# Patient Record
Sex: FEMALE | Race: WHITE | NOT HISPANIC OR LATINO
[De-identification: names, ages, dates, MRNs, and addresses within clinical notes are randomized per-mention and may not be internally consistent; named-entity substitution may affect disease eponyms.]

---

## 2019-11-14 ENCOUNTER — APPOINTMENT (OUTPATIENT)
Dept: OPHTHALMOLOGY | Facility: CLINIC | Age: 44
End: 2019-11-14

## 2019-12-05 ENCOUNTER — APPOINTMENT (OUTPATIENT)
Dept: OPHTHALMOLOGY | Facility: CLINIC | Age: 44
End: 2019-12-05

## 2019-12-05 ENCOUNTER — OUTPATIENT (OUTPATIENT)
Dept: OUTPATIENT SERVICES | Facility: HOSPITAL | Age: 44
LOS: 1 days | End: 2019-12-05

## 2019-12-05 DIAGNOSIS — H40.009 PREGLAUCOMA, UNSPECIFIED, UNSPECIFIED EYE: ICD-10-CM

## 2019-12-05 DIAGNOSIS — H18.609 KERATOCONUS, UNSPECIFIED, UNSPECIFIED EYE: ICD-10-CM

## 2019-12-05 PROBLEM — Z00.00 ENCOUNTER FOR PREVENTIVE HEALTH EXAMINATION: Status: ACTIVE | Noted: 2019-12-05

## 2021-04-15 ENCOUNTER — OUTPATIENT (OUTPATIENT)
Dept: OUTPATIENT SERVICES | Facility: HOSPITAL | Age: 46
LOS: 1 days | End: 2021-04-15

## 2021-04-15 ENCOUNTER — APPOINTMENT (OUTPATIENT)
Dept: OPHTHALMOLOGY | Facility: CLINIC | Age: 46
End: 2021-04-15

## 2021-04-16 DIAGNOSIS — H18.609 KERATOCONUS, UNSPECIFIED, UNSPECIFIED EYE: ICD-10-CM

## 2021-04-16 DIAGNOSIS — H40.003 PREGLAUCOMA, UNSPECIFIED, BILATERAL: ICD-10-CM

## 2022-08-11 ENCOUNTER — APPOINTMENT (OUTPATIENT)
Dept: OPHTHALMOLOGY | Facility: CLINIC | Age: 47
End: 2022-08-11

## 2022-08-11 ENCOUNTER — OUTPATIENT (OUTPATIENT)
Dept: OUTPATIENT SERVICES | Facility: HOSPITAL | Age: 47
LOS: 1 days | End: 2022-08-11

## 2022-08-12 DIAGNOSIS — H35.30 UNSPECIFIED MACULAR DEGENERATION: ICD-10-CM

## 2022-08-12 DIAGNOSIS — H40.009 PREGLAUCOMA, UNSPECIFIED, UNSPECIFIED EYE: ICD-10-CM

## 2022-09-11 ENCOUNTER — NON-APPOINTMENT (OUTPATIENT)
Age: 47
End: 2022-09-11

## 2022-09-26 ENCOUNTER — NON-APPOINTMENT (OUTPATIENT)
Age: 47
End: 2022-09-26

## 2022-09-27 ENCOUNTER — NON-APPOINTMENT (OUTPATIENT)
Age: 47
End: 2022-09-27

## 2022-09-27 ENCOUNTER — APPOINTMENT (OUTPATIENT)
Dept: CARDIOLOGY | Facility: CLINIC | Age: 47
End: 2022-09-27

## 2022-09-27 VITALS
WEIGHT: 150 LBS | OXYGEN SATURATION: 100 % | SYSTOLIC BLOOD PRESSURE: 90 MMHG | DIASTOLIC BLOOD PRESSURE: 62 MMHG | BODY MASS INDEX: 26.58 KG/M2 | HEART RATE: 64 BPM | HEIGHT: 63 IN

## 2022-09-27 DIAGNOSIS — R07.89 OTHER CHEST PAIN: ICD-10-CM

## 2022-09-27 PROCEDURE — 99244 OFF/OP CNSLTJ NEW/EST MOD 40: CPT

## 2022-09-27 PROCEDURE — 93015 CV STRESS TEST SUPVJ I&R: CPT

## 2022-09-27 PROCEDURE — 93000 ELECTROCARDIOGRAM COMPLETE: CPT

## 2022-09-27 PROCEDURE — 99214 OFFICE O/P EST MOD 30 MIN: CPT

## 2022-09-27 RX ORDER — SERTRALINE 25 MG/1
25 TABLET, FILM COATED ORAL DAILY
Refills: 0 | Status: ACTIVE | COMMUNITY

## 2022-09-27 RX ORDER — USTEKINUMAB 90 MG/ML
90 INJECTION, SOLUTION SUBCUTANEOUS
Refills: 0 | Status: ACTIVE | COMMUNITY

## 2022-09-27 RX ORDER — LEVOTHYROXINE SODIUM 75 UG/1
75 TABLET ORAL DAILY
Refills: 0 | Status: ACTIVE | COMMUNITY

## 2022-09-27 RX ORDER — BUPROPION HYDROCHLORIDE 100 MG/1
TABLET, FILM COATED ORAL DAILY
Refills: 0 | Status: ACTIVE | COMMUNITY

## 2022-09-28 NOTE — ASSESSMENT
[FreeTextEntry1] : A/P:\par \par *Surveillance stress testing\par *no symptoms.\par -exericse treadmill\par \par Will review results of EST after it is completed.\par ==================\par \par Reviewed results of EST performed same day as visit.\par No symptoms no ischemia at peak stress.\par Normal study no further evaluation.\par No followup scheduled.

## 2022-09-28 NOTE — HISTORY OF PRESENT ILLNESS
[FreeTextEntry1] : BOBY DEL CID (BOBY)1975(47y)F\par \par "may need stress test - referred by internist"\par \par 48 y/o\par \par *crohn's\par *anxiety\par *hypoT4\par \par pt states she was referred for stress test for surveillance ("check up').\par No chest discomfort.  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

## 2023-04-27 ENCOUNTER — APPOINTMENT (OUTPATIENT)
Dept: ULTRASOUND IMAGING | Facility: CLINIC | Age: 48
End: 2023-04-27

## 2023-04-27 ENCOUNTER — APPOINTMENT (OUTPATIENT)
Dept: MAMMOGRAPHY | Facility: CLINIC | Age: 48
End: 2023-04-27

## 2023-11-07 ENCOUNTER — ASOB RESULT (OUTPATIENT)
Age: 48
End: 2023-11-07

## 2023-11-07 ENCOUNTER — APPOINTMENT (OUTPATIENT)
Dept: ANTEPARTUM | Facility: CLINIC | Age: 48
End: 2023-11-07
Payer: COMMERCIAL

## 2023-11-07 PROCEDURE — 76811 OB US DETAILED SNGL FETUS: CPT

## 2023-11-16 ENCOUNTER — APPOINTMENT (OUTPATIENT)
Dept: PEDIATRIC CARDIOLOGY | Facility: CLINIC | Age: 48
End: 2023-11-16
Payer: COMMERCIAL

## 2023-11-16 PROCEDURE — 99203 OFFICE O/P NEW LOW 30 MIN: CPT | Mod: 25

## 2023-11-16 PROCEDURE — 76825 ECHO EXAM OF FETAL HEART: CPT

## 2023-11-16 PROCEDURE — 93325 DOPPLER ECHO COLOR FLOW MAPG: CPT

## 2023-11-16 PROCEDURE — 76827 ECHO EXAM OF FETAL HEART: CPT

## 2024-01-17 ENCOUNTER — OUTPATIENT (OUTPATIENT)
Dept: OUTPATIENT SERVICES | Facility: HOSPITAL | Age: 49
LOS: 1 days | End: 2024-01-17
Payer: COMMERCIAL

## 2024-01-17 VITALS
RESPIRATION RATE: 16 BRPM | DIASTOLIC BLOOD PRESSURE: 55 MMHG | OXYGEN SATURATION: 99 % | SYSTOLIC BLOOD PRESSURE: 105 MMHG | TEMPERATURE: 98 F | HEART RATE: 69 BPM

## 2024-01-17 VITALS
TEMPERATURE: 98 F | SYSTOLIC BLOOD PRESSURE: 110 MMHG | RESPIRATION RATE: 18 BRPM | HEART RATE: 70 BPM | DIASTOLIC BLOOD PRESSURE: 59 MMHG

## 2024-01-17 DIAGNOSIS — Z98.1 ARTHRODESIS STATUS: Chronic | ICD-10-CM

## 2024-01-17 DIAGNOSIS — O26.899 OTHER SPECIFIED PREGNANCY RELATED CONDITIONS, UNSPECIFIED TRIMESTER: ICD-10-CM

## 2024-01-17 PROCEDURE — 59025 FETAL NON-STRESS TEST: CPT

## 2024-01-17 PROCEDURE — 99221 1ST HOSP IP/OBS SF/LOW 40: CPT | Mod: 25

## 2024-01-17 PROCEDURE — 59025 FETAL NON-STRESS TEST: CPT | Mod: 26

## 2024-01-17 PROCEDURE — G0463: CPT

## 2024-01-17 NOTE — OB PROVIDER TRIAGE NOTE - HISTORY OF PRESENT ILLNESS
Subjective  HPI: 48yoF  at 30w5d presents for vaginal bleeding. Pt reports a small volume of light pink staining yesterday. Today pt noted the bleeding is bright red, still just spotting. Pt was seen by her OB/GYN who noted a cervical polyp that wasn't bleeding at the time of the evaluation. Pt was sent in for prolonged monitoring for "dips" noted at the NST preformed at this time. Pt otherwise feels well. Denies abdominal pain / contractions. No LOF. Good fetal movement. Pt reports  gestational age presents for _. +FM. -LOF. -CTXs. -VB. Pt denies any other concerns.    – PNC: Denies prenatal issues. GBS _.  EFW _g by sono.  – OBHx:   – GynHx: denies  – PMH: denies  – PSH: denies  – Psych: denies   – Social: denies   – Meds: PNV   – Allergies: NKDA  – Will accept blood transfusions? Yes    Objective  Vital signs    – PE:   CV: RRR  Pulm: breathing comfortably on RA  Abd: gravid, nontender  Extr: moving all extremities with ease  – FS:   – Spec: pooling, nitrazine, ferning, bleeding,  (lesions if patient with HSV2 history)  – VE: //  – FHT: baseline 1, mod variability, +accels, -decels  – [place]: qmin  – EFW: _g by sono  – Sono: vertex    Assessment  – No prenatal issues. GBS _.    Plan        Patient discussed with attending physician, Dr. Blanco Vivar, PGY-3 Subjective  HPI: 48yoF  at 30w5d presents for vaginal bleeding. Pt reports a small volume of light pink staining yesterday. Today pt noted the bleeding is bright red, still just spotting. Pt was seen by her OB/GYN who noted a cervical polyp that wasn't bleeding at the time of the evaluation. Pt was sent in for prolonged monitoring for "dips" noted at the NST preformed at this time. Pt otherwise feels well. Denies abdominal pain / contractions. No LOF. Good fetal movement. Pt denies any recent increase in physical activity. Reports pelvic rest. Good PO hydration. Denies constipation, diarrhea, urinary frequency, or dysuria. Pt has a history of Chrons disease but denies rectal bleeding.  reports  gestational age presents for +FM. -LOF. -CTXs. -VB. Pt denies any other concerns.    – PNC: Denies prenatal issues. GBS _.  EFW _g by sono.  – OBHx:   – GynHx: denies  – PMH: denies  – PSH: denies  – Psych: denies   – Social: denies   – Meds: PNV   – Allergies: NKDA  – Will accept blood transfusions? Yes    Objective  Vital signs    – PE:   CV: RRR  Pulm: breathing comfortably on RA  Abd: gravid, nontender  Extr: moving all extremities with ease  – FS:   – Spec: pooling, nitrazine, ferning, bleeding,  (lesions if patient with HSV2 history)  – VE: //  – FHT: baseline 1, mod variability, +accels, -decels  – [place]: qmin  – EFW: _g by sono  – Sono: vertex    Assessment  – No prenatal issues. GBS _.    Plan        Patient discussed with attending physician, Dr. Blanco Vivar, PGY-3 Subjective  HPI: 48yoF  at 30w5d presents for vaginal bleeding. Pt reports a small volume of light pink staining yesterday. Today pt noted the bleeding is bright red, still just spotting. Pt was seen by her OB/GYN who noted a cervical polyp that wasn't bleeding at the time of the evaluation. Pt was sent in for prolonged monitoring for "dips" noted at the NST preformed at this time. Pt otherwise feels well. Denies abdominal pain / contractions. No LOF. Good fetal movement. Pt denies any recent increase in physical activity. Reports pelvic rest. Good PO hydration. Denies constipation, diarrhea, urinary frequency, or dysuria. Pt has a history of Chrons disease but denies rectal bleeding and reports that she is currently well controlled.     – PNC: IVF pregnancy. Gestational hypothyroidism. Denies previous episodes of vaginal bleeding.  EFW ~1000g by sono 2weeks ago.   – OBHx: First pregnancy   – GynHx: Denies   – PMH: Chrons disease  – PSH: Scoliosis s/p kriss placement on .    – Psych: denies   – Social: denies   – Meds: PNV, ASA, Synthroid 112mcg   – Allergies: NKDA  – Will accept blood transfusions? Yes    Objective  Vital Signs Last 24 Hrs  T(C): 36.8 (2024 12:52), Max: 36.8 (2024 12:52)  T(F): 98.2 (2024 12:52), Max: 98.2 (2024 12:52)  HR: 73 (2024 14:40) (66 - 81)  BP: 105/55 (2024 13:01) (105/55 - 105/55)  BP(mean): --  RR: 16 (2024 12:52) (16 - 16)  SpO2: 100% (2024 14:40) (97% - 100%)    Parameters below as of 2024 12:52  Patient On (Oxygen Delivery Method): room air    – PE:   CV: RRR  Pulm: breathing comfortably on RA  Abd: gravid, nontender  Extr: moving all extremities with ease  – Spec: 1cm cervical polyp, pinpoint area of bleeding noted. Scant bleeding noted in the vault.   – FHT: baseline 140, mod variability, +accels, -decels  – [place]: None  – EFW: ~1000g by sono last week   – Sono: vertex. [place] 13. BPP 8/8. CL, average 3.8

## 2024-01-17 NOTE — OB RN TRIAGE NOTE - FALL HARM RISK - UNIVERSAL INTERVENTIONS
Bed in lowest position, wheels locked, appropriate side rails in place/Call bell, personal items and telephone in reach/Instruct patient to call for assistance before getting out of bed or chair/Non-slip footwear when patient is out of bed/[place] to call system/Physically safe environment - no spills, clutter or unnecessary equipment/Purposeful Proactive Rounding/Room/bathroom lighting operational, light cord in reach

## 2024-01-17 NOTE — OB PROVIDER TRIAGE NOTE - ATTENDING COMMENTS
OB attending     Patient seen agree with above     P0 at 30 weeks sent from office for prolonged monitoring due to 2 variables noted, reactive NST here no decels   Exam showed cervical polyp, minimal blood in vault, reassurance provided     NST reactive, BPP 8-8    Plan:   -DC home  -FU office in 1 week as scheduled  -bleeding precautions given    Martha Simeon MD

## 2024-01-17 NOTE — OB PROVIDER TRIAGE NOTE - NSOBPROVIDERNOTE_OBGYN_ALL_OB_FT
Assessment  – Vaginal bleeding from cervical polyp. No abdominal pain or contractions seen on toco. Fetal status reassuring.     Plan  - Pt is hemodynamically stable with scant bright red bleeding.   - NST for one hour.   - If fetal status is reassuring then pt to be discharged home with pad counts and return precautions.       Patient discussed with attending physician, Dr. Blanco Vivar, PGY-3

## 2024-01-19 DIAGNOSIS — O36.8330 MATERNAL CARE FOR ABNORMALITIES OF THE FETAL HEART RATE OR RHYTHM, THIRD TRIMESTER, NOT APPLICABLE OR UNSPECIFIED: ICD-10-CM

## 2024-01-19 DIAGNOSIS — Z3A.30 30 WEEKS GESTATION OF PREGNANCY: ICD-10-CM

## 2024-01-19 DIAGNOSIS — E03.9 HYPOTHYROIDISM, UNSPECIFIED: ICD-10-CM

## 2024-01-19 DIAGNOSIS — O46.93 ANTEPARTUM HEMORRHAGE, UNSPECIFIED, THIRD TRIMESTER: ICD-10-CM

## 2024-01-19 DIAGNOSIS — O09.523 SUPERVISION OF ELDERLY MULTIGRAVIDA, THIRD TRIMESTER: ICD-10-CM

## 2024-01-19 DIAGNOSIS — O09.813 SUPERVISION OF PREGNANCY RESULTING FROM ASSISTED REPRODUCTIVE TECHNOLOGY, THIRD TRIMESTER: ICD-10-CM

## 2024-01-19 DIAGNOSIS — O99.283 ENDOCRINE, NUTRITIONAL AND METABOLIC DISEASES COMPLICATING PREGNANCY, THIRD TRIMESTER: ICD-10-CM

## 2024-02-07 PROBLEM — E03.9 HYPOTHYROIDISM, UNSPECIFIED: Chronic | Status: ACTIVE | Noted: 2024-01-17

## 2024-02-07 PROBLEM — K50.90 CROHN'S DISEASE, UNSPECIFIED, WITHOUT COMPLICATIONS: Chronic | Status: ACTIVE | Noted: 2024-01-17

## 2024-02-09 ENCOUNTER — NON-APPOINTMENT (OUTPATIENT)
Age: 49
End: 2024-02-09

## 2024-02-15 ENCOUNTER — APPOINTMENT (OUTPATIENT)
Dept: ANTEPARTUM | Facility: CLINIC | Age: 49
End: 2024-02-15
Payer: COMMERCIAL

## 2024-02-15 PROCEDURE — 99367 TEAM CONF W/O PAT BY PHYS: CPT | Mod: 1L

## 2024-02-29 ENCOUNTER — EMERGENCY (EMERGENCY)
Facility: HOSPITAL | Age: 49
LOS: 1 days | Discharge: HOME | End: 2024-02-29
Attending: EMERGENCY MEDICINE
Payer: COMMERCIAL

## 2024-02-29 VITALS
RESPIRATION RATE: 19 BRPM | HEART RATE: 88 BPM | OXYGEN SATURATION: 99 % | HEIGHT: 64 IN | TEMPERATURE: 98 F | DIASTOLIC BLOOD PRESSURE: 63 MMHG | WEIGHT: 190.04 LBS | SYSTOLIC BLOOD PRESSURE: 102 MMHG

## 2024-02-29 VITALS
OXYGEN SATURATION: 100 % | TEMPERATURE: 98 F | HEART RATE: 88 BPM | SYSTOLIC BLOOD PRESSURE: 121 MMHG | DIASTOLIC BLOOD PRESSURE: 68 MMHG | RESPIRATION RATE: 18 BRPM

## 2024-02-29 DIAGNOSIS — Z98.1 ARTHRODESIS STATUS: Chronic | ICD-10-CM

## 2024-02-29 LAB
ALBUMIN SERPL ELPH-MCNC: 3.2 G/DL — LOW (ref 3.3–5)
ALP SERPL-CCNC: 137 U/L — HIGH (ref 40–120)
ALT FLD-CCNC: 15 U/L — SIGNIFICANT CHANGE UP (ref 10–45)
ANION GAP SERPL CALC-SCNC: 10 MMOL/L — SIGNIFICANT CHANGE UP (ref 5–17)
AST SERPL-CCNC: 23 U/L — SIGNIFICANT CHANGE UP (ref 10–40)
BASOPHILS # BLD AUTO: 0 K/UL — SIGNIFICANT CHANGE UP (ref 0–0.2)
BASOPHILS NFR BLD AUTO: 0 % — SIGNIFICANT CHANGE UP (ref 0–2)
BILIRUB SERPL-MCNC: 0.2 MG/DL — SIGNIFICANT CHANGE UP (ref 0.2–1.2)
BUN SERPL-MCNC: 5 MG/DL — LOW (ref 7–23)
CALCIUM SERPL-MCNC: 9.7 MG/DL — SIGNIFICANT CHANGE UP (ref 8.4–10.5)
CHLORIDE SERPL-SCNC: 103 MMOL/L — SIGNIFICANT CHANGE UP (ref 96–108)
CO2 SERPL-SCNC: 25 MMOL/L — SIGNIFICANT CHANGE UP (ref 22–31)
CREAT SERPL-MCNC: 0.64 MG/DL — SIGNIFICANT CHANGE UP (ref 0.5–1.3)
DACRYOCYTES BLD QL SMEAR: SLIGHT — SIGNIFICANT CHANGE UP
EGFR: 109 ML/MIN/1.73M2 — SIGNIFICANT CHANGE UP
ELLIPTOCYTES BLD QL SMEAR: SLIGHT — SIGNIFICANT CHANGE UP
EOSINOPHIL # BLD AUTO: 0 K/UL — SIGNIFICANT CHANGE UP (ref 0–0.5)
EOSINOPHIL NFR BLD AUTO: 0 % — SIGNIFICANT CHANGE UP (ref 0–6)
FLUAV AG NPH QL: DETECTED
FLUBV AG NPH QL: SIGNIFICANT CHANGE UP
GLUCOSE SERPL-MCNC: 90 MG/DL — SIGNIFICANT CHANGE UP (ref 70–99)
HCT VFR BLD CALC: 32.3 % — LOW (ref 34.5–45)
HGB BLD-MCNC: 10.6 G/DL — LOW (ref 11.5–15.5)
LYMPHOCYTES # BLD AUTO: 0.55 K/UL — LOW (ref 1–3.3)
LYMPHOCYTES # BLD AUTO: 6.1 % — LOW (ref 13–44)
MANUAL SMEAR VERIFICATION: SIGNIFICANT CHANGE UP
MCHC RBC-ENTMCNC: 32.1 PG — SIGNIFICANT CHANGE UP (ref 27–34)
MCHC RBC-ENTMCNC: 32.8 GM/DL — SIGNIFICANT CHANGE UP (ref 32–36)
MCV RBC AUTO: 97.9 FL — SIGNIFICANT CHANGE UP (ref 80–100)
METAMYELOCYTES # FLD: 0.9 % — HIGH (ref 0–0)
MONOCYTES # BLD AUTO: 0.23 K/UL — SIGNIFICANT CHANGE UP (ref 0–0.9)
MONOCYTES NFR BLD AUTO: 2.6 % — SIGNIFICANT CHANGE UP (ref 2–14)
MYELOCYTES NFR BLD: 0.9 % — HIGH (ref 0–0)
NEUTROPHILS # BLD AUTO: 8.04 K/UL — HIGH (ref 1.8–7.4)
NEUTROPHILS NFR BLD AUTO: 88.6 % — HIGH (ref 43–77)
NEUTS BAND # BLD: 0.9 % — SIGNIFICANT CHANGE UP (ref 0–8)
OVALOCYTES BLD QL SMEAR: SLIGHT — SIGNIFICANT CHANGE UP
PLAT MORPH BLD: NORMAL — SIGNIFICANT CHANGE UP
PLATELET # BLD AUTO: 166 K/UL — SIGNIFICANT CHANGE UP (ref 150–400)
POIKILOCYTOSIS BLD QL AUTO: SLIGHT — SIGNIFICANT CHANGE UP
POLYCHROMASIA BLD QL SMEAR: SLIGHT — SIGNIFICANT CHANGE UP
POTASSIUM SERPL-MCNC: 3 MMOL/L — LOW (ref 3.5–5.3)
POTASSIUM SERPL-SCNC: 3 MMOL/L — LOW (ref 3.5–5.3)
PROT SERPL-MCNC: 6.2 G/DL — SIGNIFICANT CHANGE UP (ref 6–8.3)
RBC # BLD: 3.3 M/UL — LOW (ref 3.8–5.2)
RBC # FLD: 13.2 % — SIGNIFICANT CHANGE UP (ref 10.3–14.5)
RBC BLD AUTO: ABNORMAL
RSV RNA NPH QL NAA+NON-PROBE: SIGNIFICANT CHANGE UP
SARS-COV-2 RNA SPEC QL NAA+PROBE: SIGNIFICANT CHANGE UP
SODIUM SERPL-SCNC: 138 MMOL/L — SIGNIFICANT CHANGE UP (ref 135–145)
WBC # BLD: 8.98 K/UL — SIGNIFICANT CHANGE UP (ref 3.8–10.5)
WBC # FLD AUTO: 8.98 K/UL — SIGNIFICANT CHANGE UP (ref 3.8–10.5)

## 2024-02-29 PROCEDURE — 71045 X-RAY EXAM CHEST 1 VIEW: CPT | Mod: 26

## 2024-02-29 PROCEDURE — 99285 EMERGENCY DEPT VISIT HI MDM: CPT | Mod: 25

## 2024-02-29 PROCEDURE — 87637 SARSCOV2&INF A&B&RSV AMP PRB: CPT

## 2024-02-29 PROCEDURE — 71045 X-RAY EXAM CHEST 1 VIEW: CPT

## 2024-02-29 PROCEDURE — 93005 ELECTROCARDIOGRAM TRACING: CPT

## 2024-02-29 PROCEDURE — 85025 COMPLETE CBC W/AUTO DIFF WBC: CPT

## 2024-02-29 PROCEDURE — 36415 COLL VENOUS BLD VENIPUNCTURE: CPT

## 2024-02-29 PROCEDURE — 99285 EMERGENCY DEPT VISIT HI MDM: CPT

## 2024-02-29 PROCEDURE — 80053 COMPREHEN METABOLIC PANEL: CPT

## 2024-02-29 RX ORDER — POTASSIUM CHLORIDE 20 MEQ
40 PACKET (EA) ORAL ONCE
Refills: 0 | Status: COMPLETED | OUTPATIENT
Start: 2024-02-29 | End: 2024-02-29

## 2024-02-29 RX ADMIN — Medication 40 MILLIEQUIVALENT(S): at 18:42

## 2024-02-29 RX ADMIN — Medication 75 MILLIGRAM(S): at 18:42

## 2024-02-29 NOTE — ED PROVIDER NOTE - OBJECTIVE STATEMENT
48-year-old female with past medical history Crohn disease, G1, P0, presents to the emergency department due to 5 days of shortness of breath associated with cough, congestion, body aches.  Highest temperature measured at home was 99 °F.  She went to an urgent care yesterday where she was given a Z-Lamine.  She called her OB/GYN, Dr. Montoya, who told her to come into the emergency department today after she also reported right-sided chest pain due to concern for pneumonia.

## 2024-02-29 NOTE — ED PROVIDER NOTE - NSFOLLOWUPINSTRUCTIONS_ED_ALL_ED_FT
You were seen in the emergency department for shortness of breath. We have evaluated you and determined that you do not require further hospital interventions.    During your stay you had the following relevant results: A chest x-ray that does not show pneumonia, a viral swab positive for influenza A    Please follow up with your OB/GYN to discuss the results of your stay in our department.    You are being sent home with one or more prescription medications.  The dosing, frequency, and pharmacy information are included in this discharge paperwork.  Please take each one as instructed.    If you start to experience worsening symptoms such as nausea or vomiting, fevers or chills, chest pain or shortness of breath, please return to the emergency department for further evaluation.

## 2024-02-29 NOTE — ED ADULT NURSE NOTE - NSFALLASSESSNEED_ED_ALL_ED
HgbA1c, as we discussed is 7%, good control.  Thyroid is in normal range.    Kidney function is at baseline, improved from six months ago.    Vitamin B12 is slightly low, I would like her to start 1000 mcg of Vitamin B12 over the counter daily.    Lipids/cholesterol is well controlled.    Morena Mcintosh M.D.      
no

## 2024-02-29 NOTE — ED PROVIDER NOTE - ATTENDING CONTRIBUTION TO CARE
Attending MD Jama: I personally have seen and examined this patient.  Resident note reviewed and agree on plan of care and except where noted.  See below for details.     Seen in [facility] 9  Ob/Gyn Dr. Tatiana Montoya    48F with PMH/PSH including Crohn's on Stelara (last 2/21/24), pregnant 37 weeks (scheduled for C section on 3/5/24) presents to the ED with shortness of breath, cough, congestion, body aches for 5 days.  Reports last night Tmax 99, reports took Tylenol for it.  Reports went to Urgent Care yesterday AM and was started on Z pack, has taken 2 doses (3 pills) since Rx.  Denies dizziness, near syncope.  Denies abdominal pain, diarrhea.  Reports had single episode of emesis after eating pizza last night.  Reports this morning developed R side posterior chest pain, denies radiation, worse in that spot with deep inspiration.  Reports chest pain with coughing, not otherwise.  Reports called Dr. Montoya's office spoke to ?NP who sent in for concern for PNA. Attending MD Jama: I personally have seen and examined this patient.  Resident note reviewed and agree on plan of care and except where noted.  See below for details.     Seen in [facility] 9  Ob/Gyn Dr. Tatiana Montoya    48F with PMH/PSH including Crohn's on Stelara (last 24), , pregnant 37 weeks (scheduled for C section on 3/5/24) presents to the ED with shortness of breath, cough, congestion, body aches, fatigue for 5 days.  Reports last night Tmax 99, reports took Tylenol for it.  Reports went to Urgent Care yesterday AM and was started on Z pack, has taken 2 doses (3 pills) since Rx.  Denies dizziness, near syncope.  Denies abdominal pain, diarrhea.  Reports had single episode of emesis after eating pizza last night.  Reports this morning developed R side posterior chest pain, denies radiation, worse in that spot with deep inspiration.  Reports chest pain with coughing, not otherwise.  Reports called Dr. Montoya's office spoke to ?NP who sent in for concern for PNA.  Reports has LE edema but reports unchanged from that which she has had with pregnancy.  Denies dysuria, hematuria.        TO BE COMPLETED Attending MD Jama: I personally have seen and examined this patient.  Resident note reviewed and agree on plan of care and except where noted.  See below for details.     Seen in [facility] 9  Ob/Gyn Dr. Tatiana Montoya    48F with PMH/PSH including s/p spinal sx, Crohn's on Stelara (last 24), , pregnant 37 weeks (scheduled for C section on 3/5/24) presents to the ED with shortness of breath, cough, congestion, body aches, fatigue for 5 days.  Reports last night Tmax 99, reports took Tylenol for it.  Reports went to Urgent Care yesterday AM and was started on Z pack, has taken 2 doses (3 pills) since Rx.  Denies dizziness, near syncope.  Denies abdominal pain, diarrhea.  Reports had single episode of emesis after eating pizza last night.  Reports this morning developed R side posterior chest pain, denies radiation, worse in that spot with deep inspiration.  Reports chest pain with coughing, not otherwise.  Reports called Dr. Montoya's office spoke to ?NP who sent in for concern for PNA.  Reports has LE edema but reports unchanged from that which she has had with pregnancy.  Denies dysuria, hematuria.      Exam:   General: NAD  HENT: head NCAT, airway patent  Eyes: anicteric, no conjunctival injection   Lungs: lungs CTAB with good inspiratory effort, no wheezing, no rhonchi, no rales  Cardiac: +S1S2, no obvious m/r/g, sat'ing 100% on RA, no increased work of breathing, no tachypnea  GI: abdomen soft with +BS, NT, +gravid abdomen  : no CVAT  MSK: ranging neck and extremities freely, no erythema or warmth, +bilateral LE edema  Neuro: moving all extremities spontaneously, nonfocal  Psych: normal mood and affect Attending MD Jama: I personally have seen and examined this patient.  Resident note reviewed and agree on plan of care and except where noted.  See below for details.     Seen in [facility] 9  Ob/Gyn Dr. Tatiana Montoya    48F with PMH/PSH including s/p spinal sx, Crohn's on Stelara (last 24), , pregnant 37 weeks (scheduled for C section on 3/5/24) presents to the ED with shortness of breath, cough, congestion, body aches, fatigue for 5 days.  Reports last night Tmax 99, reports took Tylenol for it.  Reports went to Urgent Care yesterday AM and was started on Z pack, has taken 2 doses (3 pills) since Rx.  Denies dizziness, near syncope.  Denies abdominal pain, diarrhea.  Reports had single episode of emesis after eating pizza last night.  Reports this morning developed R side posterior chest pain, denies radiation, worse in that spot with deep inspiration.  Reports chest pain with coughing, not otherwise.  Reports called Dr. Montoya's office spoke to ?NP who sent in for concern for PNA.  Reports has LE edema but reports unchanged from that which she has had with pregnancy.  Denies dysuria, hematuria.      Exam:   General: NAD  HENT: head NCAT, airway patent  Eyes: anicteric, no conjunctival injection   Lungs: lungs CTAB with good inspiratory effort, no wheezing, no rhonchi, no rales  Cardiac: +S1S2, no obvious m/r/g, sat'ing 100% on RA, no increased work of breathing, no tachypnea  GI: abdomen soft with +BS, NT, +gravid abdomen  : no CVAT  MSK: ranging neck and extremities freely, no erythema or warmth, +bilateral LE edema, no calf tenderness, erythema or warmth   Neuro: moving all extremities spontaneously, nonfocal  Psych: normal mood and affect    A/P: 48F with shortness of breath, congestion, R sided back pain, being treated for PNA, will obtain CXR to eval for PNA on XR, will obtain  huddle as per protocol.  Patient not tachycardic, not tachypneic, not hypoxic, otherwise well appearing, suspect viral illness, will obtain Flu/Covid/RSV (unable to order RVP in ED), will likely obtain NST as per Ob in Emergency Department, will obtain basic labs for metabolic derangement, will obtain screening EKG

## 2024-02-29 NOTE — ED PROVIDER NOTE - NSICDXPASTSURGICALHX_GEN_ALL_CORE_FT
Writer attempted to contact Albina Moon RN, left voicemail to call clinic back at [phone].       Please [facility] triage nurses when RN calls back.     PAST SURGICAL HISTORY:  H/O spinal fusion

## 2024-02-29 NOTE — ED PROVIDER NOTE - PROGRESS NOTE DETAILS
Ashkan Wiggins MD: Case discussed with OB/GYN.  Patient has a reassuring NST.  Patient will be discharged with prescription for oseltamavir.  Potassium repleted with oral supplementation.

## 2024-02-29 NOTE — ED PROVIDER NOTE - CLINICAL SUMMARY MEDICAL DECISION MAKING FREE TEXT BOX
48F sent in by Dr. Montoya of OBGYN due to concern for pneumonia. Patient saturating well on RA, no increased WOB. Will obtain 1 view CXR, call  huddle as per hospital policy.

## 2024-02-29 NOTE — CHART NOTE - NSCHARTNOTEFT_GEN_A_CORE
R4 Chart Note     NST reviewed- baseline 130, moderate variability, + accelerations, no decelerations.     - NST reactive   - Primary care per ED team     Александр Palmer PGY-4

## 2024-02-29 NOTE — ED PROVIDER NOTE - RAPID ASSESSMENT
49 y/o female with  currently 37 weeks pregnant presents to the ED complaining of cough and chest pain for few days. Went to urgent care yesterday and was prescribed a Z pack which she has been taking. She called her obgyn today and was instructed to go the emergency department to r/o PNA. Patient would like to have a cxr today. She is scheduled to have a  next week. This is first pregnancy. Has been normal thus far. No abdominal pain or vaginal bleeding today. No fevers or difficulty breathing.     Patient was rapidly assessed via a telemedicine and/or role of Quick Triage NARGIS Walters. A limited history, physical exam and assessment was performed. The patient will be seen and further evaluated in the main emergency department. The remainder of care and evaluation will be conducted by the primary emergency medicine team. Receiving team will follow up on labs, imaging and serially reassess patient as indicated. All further decisions regarding patient care, evaluation and disposition are at the discretion of the receiving primary emergency department team.

## 2024-02-29 NOTE — ED PROVIDER NOTE - PATIENT PORTAL LINK FT
You can access the FollowMyHealth Patient Portal offered by [facility] by registering at the following website: [email or web address]. By joining [facility]’s FollowMyHealth portal, you will also be able to view your health information using other applications (apps) compatible with our system.

## 2024-02-29 NOTE — ED ADULT NURSE NOTE - OBJECTIVE STATEMENT
49 y/o female complaining of cough and congestion for a few days, seen at urgent care yesterday and prescribed a Zpack. Pt is A&Ox4, breathing spontaneously, speaking in full sentences in no acute distress. Pt is 37 weeks pregnant, scheduled to have a csection next week with a normal pregnancy up to this point. Pt sent in from an urgent care for a chest xray. Pt has pmh of chrohns, scoliosis. Pt denies chest pain, sob, fever chills, burning with urination or frequency. Pt endorses body aches, and semiproductive cough with yellow sputum. Lung sounds clear and equal bilat, abdomen soft nontender, nondistendedx4 quadrants.  huddle called by Dr. Jama. PT placed on cardiac monitor (NSR). Pt bed in lowest position and educated on use of call bell.

## 2024-03-04 ENCOUNTER — OUTPATIENT (OUTPATIENT)
Dept: OUTPATIENT SERVICES | Facility: HOSPITAL | Age: 49
LOS: 1 days | End: 2024-03-04
Payer: COMMERCIAL

## 2024-03-04 ENCOUNTER — TRANSCRIPTION ENCOUNTER (OUTPATIENT)
Age: 49
End: 2024-03-04

## 2024-03-04 VITALS — WEIGHT: 190.04 LBS | RESPIRATION RATE: 16 BRPM | HEIGHT: 64 IN

## 2024-03-04 DIAGNOSIS — O09.523 SUPERVISION OF ELDERLY MULTIGRAVIDA, THIRD TRIMESTER: ICD-10-CM

## 2024-03-04 DIAGNOSIS — Z98.1 ARTHRODESIS STATUS: Chronic | ICD-10-CM

## 2024-03-04 DIAGNOSIS — Z29.9 ENCOUNTER FOR PROPHYLACTIC MEASURES, UNSPECIFIED: ICD-10-CM

## 2024-03-04 DIAGNOSIS — Z01.818 ENCOUNTER FOR OTHER PREPROCEDURAL EXAMINATION: ICD-10-CM

## 2024-03-04 LAB
ANION GAP SERPL CALC-SCNC: 11 MMOL/L — SIGNIFICANT CHANGE UP (ref 5–17)
BLD GP AB SCN SERPL QL: NEGATIVE — SIGNIFICANT CHANGE UP
BUN SERPL-MCNC: 5 MG/DL — LOW (ref 7–23)
CALCIUM SERPL-MCNC: 9.5 MG/DL — SIGNIFICANT CHANGE UP (ref 8.4–10.5)
CHLORIDE SERPL-SCNC: 104 MMOL/L — SIGNIFICANT CHANGE UP (ref 96–108)
CO2 SERPL-SCNC: 22 MMOL/L — SIGNIFICANT CHANGE UP (ref 22–31)
CREAT SERPL-MCNC: 0.67 MG/DL — SIGNIFICANT CHANGE UP (ref 0.5–1.3)
EGFR: 108 ML/MIN/1.73M2 — SIGNIFICANT CHANGE UP
GLUCOSE SERPL-MCNC: 77 MG/DL — SIGNIFICANT CHANGE UP (ref 70–99)
HCT VFR BLD CALC: 34.8 % — SIGNIFICANT CHANGE UP (ref 34.5–45)
HGB BLD-MCNC: 11.5 G/DL — SIGNIFICANT CHANGE UP (ref 11.5–15.5)
MCHC RBC-ENTMCNC: 31.5 PG — SIGNIFICANT CHANGE UP (ref 27–34)
MCHC RBC-ENTMCNC: 33 GM/DL — SIGNIFICANT CHANGE UP (ref 32–36)
MCV RBC AUTO: 95.3 FL — SIGNIFICANT CHANGE UP (ref 80–100)
NRBC # BLD: 0 /100 WBCS — SIGNIFICANT CHANGE UP (ref 0–0)
PLATELET # BLD AUTO: 206 K/UL — SIGNIFICANT CHANGE UP (ref 150–400)
POTASSIUM SERPL-MCNC: 3.1 MMOL/L — LOW (ref 3.5–5.3)
POTASSIUM SERPL-SCNC: 3.1 MMOL/L — LOW (ref 3.5–5.3)
RBC # BLD: 3.65 M/UL — LOW (ref 3.8–5.2)
RBC # FLD: 12.7 % — SIGNIFICANT CHANGE UP (ref 10.3–14.5)
RH IG SCN BLD-IMP: POSITIVE — SIGNIFICANT CHANGE UP
SODIUM SERPL-SCNC: 137 MMOL/L — SIGNIFICANT CHANGE UP (ref 135–145)
WBC # BLD: 7.96 K/UL — SIGNIFICANT CHANGE UP (ref 3.8–10.5)
WBC # FLD AUTO: 7.96 K/UL — SIGNIFICANT CHANGE UP (ref 3.8–10.5)

## 2024-03-04 PROCEDURE — G0463: CPT

## 2024-03-04 PROCEDURE — 36415 COLL VENOUS BLD VENIPUNCTURE: CPT

## 2024-03-04 PROCEDURE — 86900 BLOOD TYPING SEROLOGIC ABO: CPT

## 2024-03-04 PROCEDURE — 86901 BLOOD TYPING SEROLOGIC RH(D): CPT

## 2024-03-04 PROCEDURE — 86850 RBC ANTIBODY SCREEN: CPT

## 2024-03-04 PROCEDURE — 80048 BASIC METABOLIC PNL TOTAL CA: CPT

## 2024-03-04 PROCEDURE — 85027 COMPLETE CBC AUTOMATED: CPT

## 2024-03-04 RX ORDER — CHLORHEXIDINE GLUCONATE 213 G/1000ML
1 SOLUTION TOPICAL DAILY
Refills: 0 | Status: DISCONTINUED | OUTPATIENT
Start: 2024-03-05 | End: 2024-03-05

## 2024-03-04 RX ORDER — SODIUM CHLORIDE 9 MG/ML
1000 INJECTION, SOLUTION INTRAVENOUS
Refills: 0 | Status: DISCONTINUED | OUTPATIENT
Start: 2024-03-05 | End: 2024-03-05

## 2024-03-04 RX ORDER — OXYTOCIN 10 UNIT/ML
333.33 VIAL (ML) INJECTION
Qty: 20 | Refills: 0 | Status: DISCONTINUED | OUTPATIENT
Start: 2024-03-05 | End: 2024-03-08

## 2024-03-04 NOTE — OB PST NOTE - PROBLEM SELECTOR PLAN 1
Primary  Section  -cbc, bmp, type and screen at PST  -preop instructions provided  -type and screen on admit  -surgical soap and instructions given. teach back performed  - section hydration instructions given

## 2024-03-04 NOTE — OB PST NOTE - HISTORY OF PRESENT ILLNESS
48 year old female , LMP 2023 @ 37w4d gestation who presents to [facility] today prior to scheduled Primary  Section on 3/5/2024. Patient reports good fetal movement. She denies fever, chills, nausea, vomiting, abdominal pain, contractions or signs of active labor. PMH of Crohn's Disease on Stelara weekly, Hypothyroidism    **of note patient was sent to [facility] ED by Dr. Montoya of OBGYN due to concern for pneumonia, she was found to found to be influenza A positive and is currently on day # 4 of Tamiflu. CXR revealed clear lungs

## 2024-03-04 NOTE — OB PST NOTE - NSHPREVIEWOFSYSTEMS_GEN_ALL_CORE
REVIEW OF SYSTEMS:    CONSTITUTIONAL: No weakness, fevers or chills  EYES/ENT: No visual changes;  No vertigo or throat pain   NECK: No pain or stiffness  RESPIRATORY: No cough, wheezing, hemoptysis; No shortness of breath  CARDIOVASCULAR: No chest pain or palpitations  GASTROINTESTINAL: No abdominal or epigastric pain. No nausea, vomiting, or hematemesis; No diarrhea or constipation. No melena or hematochezia.  GENITOURINARY: No dysuria, frequency or hematuria, no vaginal bleeding, no vaginal discharge, no contractions  NEUROLOGICAL: No numbness or weakness  SKIN: No itching, rashes

## 2024-03-04 NOTE — OB PST NOTE - FALL HARM RISK - UNIVERSAL INTERVENTIONS
Bed in lowest position, wheels locked, appropriate side rails in place/Call bell, personal items and telephone in reach/Instruct patient to call for assistance before getting out of bed or chair/Non-slip footwear when patient is out of bed/[place] to call system/Physically safe environment - no spills, clutter or unnecessary equipment/Purposeful Proactive Rounding/Room/bathroom lighting operational, light cord in reach

## 2024-03-04 NOTE — OB PST NOTE - NSHPPHYSICALEXAM_GEN_ALL_CORE
Physical Exam:  · Constitutional	detailed exam  · Constitutional Details	well-developed; well-groomed; well-nourished; no distress  · Eyes	detailed exam  · Eyes Details	PERRL; conjunctiva clear  · ENMT	No oral lesions; no gross abnormalities  · Neck	detailed exam   · Neck Details	supple; no JVD  · Breasts	not examined  · Back	No deformity or limitation of movement   · Respiratory	detailed exam  · Respiratory Details	airway patent; breath sounds equal; good air movement; respirations non-labored; clear to auscultation bilaterally  · Cardiovascular	detailed exam  · Cardiovascular Details	regular rate and rhythm   · Gastrointestinal	detailed exam  · GI Normal	soft; nontender; no distention; bowel sounds normal; no guarding  · Genitourinary	patient refused   · Rectal	patient refused   · Extremities	detailed exam   · Extremities Details	no clubbing; no cyanosis; (+) pedal edema  · Vascular	Equal and normal pulses (carotid, femoral, dorsalis pedis)   · Neurological	detailed exam  · Neurological Details	alert and oriented x 3  · Gait/Balance	gait steady  · Skin	detailed exam  · Skin Details	warm and dry; color normal  · Lymph Nodes	detailed exam  · Lymphatic Details	posterior cervical L; posterior cervical R; anterior cervical L; anterior cervical R; supraclavicular L; supraclavicular R  · Posterior Cervical L	normal  · Posterior Cervical R	normal  · Anterior Cervical L	normal  · Anterior Cervical R	normal  · Supraclavicular L	normal  · Supraclavicular R	normal  · Musculoskeletal	detailed exam  · Musculoskeletal  normal   · Psychiatric	detailed exam  · Psychiatric Details	normal affect; normal behavior  gravid uterus denies s/s of active labor

## 2024-03-04 NOTE — OB PST NOTE - ASSESSMENT
MANOLOI VTE 2.0 SCORE [CLOT updated 2019]    AGE RELATED RISK FACTORS                                                       MOBILITY RELATED FACTORS  [x] Age 41-60 years                                            (1 Point)                    [ ] Bed rest                                                        (1 Point)  [ ] Age: 61-74 years                                           (2 Points)                  [ ] Plaster cast                                                   (2 Points)  [ ] Age= 75 years                                              (3 Points)                    [ ] Bed bound for more than 72 hours                 (2 Points)    DISEASE RELATED RISK FACTORS                                               GENDER SPECIFIC FACTORS  [ ] Edema in the lower extremities                       (1 Point)              [x] Pregnancy                                                     (1 Point)  [ ] Varicose veins                                               (1 Point)                     [ ] Post-partum < 6 weeks                                   (1 Point)             [x] BMI > 25 Kg/m2                                            (1 Point)                     [ ] Hormonal therapy  or oral contraception          (1 Point)                 [ ] Sepsis (in the previous month)                        (1 Point)               [ ] History of pregnancy complications                 (1 point)  [ ] Pneumonia or serious lung disease                                               [ ] Unexplained or recurrent                     (1 Point)           (in the previous month)                               (1 Point)  [ ] Abnormal pulmonary function test                     (1 Point)                 SURGERY RELATED RISK FACTORS  [ ] Acute myocardial infarction                              (1 Point)               [x]  Section                                             (1 Point)  [ ] Congestive heart failure (in the previous month)  (1 Point)      [ ] Minor surgery                                                  (1 Point)   [x] Inflammatory bowel disease                             (1 Point)               [ ] Arthroscopic surgery                                        (2 Points)  [ ] Central venous access                                      (2 Points)                [ ] General surgery lasting more than 45 minutes (2 points)  [ ] Malignancy- Present or previous                   (2 Points)                [ ] Elective arthroplasty                                         (5 points)    [ ] Stroke (in the previous month)                          (5 Points)                                                                                                                                                           HEMATOLOGY RELATED FACTORS                                                 TRAUMA RELATED RISK FACTORS  [ ] Prior episodes of VTE                                     (3 Points)                [ ] Fracture of the hip, pelvis, or leg                       (5 Points)  [ ] Positive family history for VTE                         (3 Points)             [ ] Acute spinal cord injury (in the previous month)  (5 Points)  [ ] Prothrombin 72937 A                                     (3 Points)               [ ] Paralysis  (less than 1 month)                             (5 Points)  [ ] Factor V Leiden                                             (3 Points)                  [ ] Multiple Trauma within 1 month                        (5 Points)  [ ] Lupus anticoagulants                                     (3 Points)                                                           [ ] Anticardiolipin antibodies                               (3 Points)                                                       [ ] High homocysteine in the blood                      (3 Points)                                             [ ] Other congenital or acquired thrombophilia      (3 Points)                                                [ ] Heparin induced thrombocytopenia                  (3 Points)                                     Total Score [ 5  ]

## 2024-03-05 ENCOUNTER — INPATIENT (INPATIENT)
Facility: HOSPITAL | Age: 49
LOS: 2 days | Discharge: HOME | End: 2024-03-08
Attending: OBSTETRICS & GYNECOLOGY | Admitting: OBSTETRICS & GYNECOLOGY
Payer: COMMERCIAL

## 2024-03-05 ENCOUNTER — TRANSCRIPTION ENCOUNTER (OUTPATIENT)
Age: 49
End: 2024-03-05

## 2024-03-05 VITALS
RESPIRATION RATE: 16 BRPM | TEMPERATURE: 98 F | SYSTOLIC BLOOD PRESSURE: 121 MMHG | HEART RATE: 65 BPM | DIASTOLIC BLOOD PRESSURE: 67 MMHG

## 2024-03-05 DIAGNOSIS — O09.523 SUPERVISION OF ELDERLY MULTIGRAVIDA, THIRD TRIMESTER: ICD-10-CM

## 2024-03-05 DIAGNOSIS — Z98.1 ARTHRODESIS STATUS: Chronic | ICD-10-CM

## 2024-03-05 LAB
BLD GP AB SCN SERPL QL: NEGATIVE — SIGNIFICANT CHANGE UP
RH IG SCN BLD-IMP: POSITIVE — SIGNIFICANT CHANGE UP
T PALLIDUM AB TITR SER: NEGATIVE — SIGNIFICANT CHANGE UP

## 2024-03-05 RX ORDER — ACETAMINOPHEN 500 MG
1000 TABLET ORAL EVERY 6 HOURS
Refills: 0 | Status: DISCONTINUED | OUTPATIENT
Start: 2024-03-05 | End: 2024-03-08

## 2024-03-05 RX ORDER — DEXAMETHASONE 0.5 MG/5ML
4 ELIXIR ORAL EVERY 6 HOURS
Refills: 0 | Status: DISCONTINUED | OUTPATIENT
Start: 2024-03-05 | End: 2024-03-06

## 2024-03-05 RX ORDER — OXYCODONE HYDROCHLORIDE 5 MG/1
5 TABLET ORAL
Refills: 0 | Status: DISCONTINUED | OUTPATIENT
Start: 2024-03-05 | End: 2024-03-06

## 2024-03-05 RX ORDER — SERTRALINE 25 MG/1
100 TABLET, FILM COATED ORAL DAILY
Refills: 0 | Status: DISCONTINUED | OUTPATIENT
Start: 2024-03-05 | End: 2024-03-08

## 2024-03-05 RX ORDER — OXYCODONE HYDROCHLORIDE 5 MG/1
10 TABLET ORAL
Refills: 0 | Status: DISCONTINUED | OUTPATIENT
Start: 2024-03-05 | End: 2024-03-06

## 2024-03-05 RX ORDER — OXYCODONE HYDROCHLORIDE 5 MG/1
5 TABLET ORAL
Refills: 0 | Status: COMPLETED | OUTPATIENT
Start: 2024-03-05 | End: 2024-03-12

## 2024-03-05 RX ORDER — OXYCODONE HYDROCHLORIDE 5 MG/1
5 TABLET ORAL ONCE
Refills: 0 | Status: DISCONTINUED | OUTPATIENT
Start: 2024-03-05 | End: 2024-03-08

## 2024-03-05 RX ORDER — CEFAZOLIN SODIUM 1 G
2000 VIAL (EA) INJECTION ONCE
Refills: 0 | Status: COMPLETED | OUTPATIENT
Start: 2024-03-05 | End: 2024-03-05

## 2024-03-05 RX ORDER — CITRIC ACID/SODIUM CITRATE 300-500 MG
15 SOLUTION, ORAL ORAL ONCE
Refills: 0 | Status: COMPLETED | OUTPATIENT
Start: 2024-03-05 | End: 2024-03-05

## 2024-03-05 RX ORDER — FAMOTIDINE 10 MG/ML
20 INJECTION INTRAVENOUS ONCE
Refills: 0 | Status: COMPLETED | OUTPATIENT
Start: 2024-03-05 | End: 2024-03-05

## 2024-03-05 RX ORDER — HEPARIN SODIUM 5000 [USP'U]/ML
5000 INJECTION INTRAVENOUS; SUBCUTANEOUS EVERY 12 HOURS
Refills: 0 | Status: DISCONTINUED | OUTPATIENT
Start: 2024-03-05 | End: 2024-03-08

## 2024-03-05 RX ORDER — MAGNESIUM HYDROXIDE 400 MG/1
30 TABLET, CHEWABLE ORAL
Refills: 0 | Status: DISCONTINUED | OUTPATIENT
Start: 2024-03-05 | End: 2024-03-08

## 2024-03-05 RX ORDER — BUPROPION HYDROCHLORIDE 150 MG/1
300 TABLET, EXTENDED RELEASE ORAL DAILY
Refills: 0 | Status: DISCONTINUED | OUTPATIENT
Start: 2024-03-05 | End: 2024-03-08

## 2024-03-05 RX ORDER — NALBUPHINE HYDROCHLORIDE 10 MG/ML
2.5 INJECTION, SOLUTION INTRAMUSCULAR; INTRAVENOUS; SUBCUTANEOUS EVERY 6 HOURS
Refills: 0 | Status: DISCONTINUED | OUTPATIENT
Start: 2024-03-05 | End: 2024-03-06

## 2024-03-05 RX ORDER — NALOXONE HYDROCHLORIDE 4 MG/.1ML
0.1 SPRAY NASAL
Refills: 0 | Status: DISCONTINUED | OUTPATIENT
Start: 2024-03-05 | End: 2024-03-06

## 2024-03-05 RX ORDER — LEVOTHYROXINE SODIUM 125 MCG
112 TABLET ORAL DAILY
Refills: 0 | Status: DISCONTINUED | OUTPATIENT
Start: 2024-03-05 | End: 2024-03-08

## 2024-03-05 RX ORDER — ACETAMINOPHEN 500 MG
975 TABLET ORAL EVERY 6 HOURS
Refills: 0 | Status: COMPLETED | OUTPATIENT
Start: 2024-03-05 | End: 2025-02-01

## 2024-03-05 RX ORDER — SIMETHICONE 80 MG/1
80 TABLET, CHEWABLE ORAL EVERY 4 HOURS
Refills: 0 | Status: DISCONTINUED | OUTPATIENT
Start: 2024-03-06 | End: 2024-03-08

## 2024-03-05 RX ORDER — MORPHINE SULFATE 50 MG/1
0.1 CAPSULE, EXTENDED RELEASE ORAL ONCE
Refills: 0 | Status: DISCONTINUED | OUTPATIENT
Start: 2024-03-05 | End: 2024-03-06

## 2024-03-05 RX ORDER — ONDANSETRON 8 MG/1
4 TABLET, FILM COATED ORAL EVERY 6 HOURS
Refills: 0 | Status: DISCONTINUED | OUTPATIENT
Start: 2024-03-05 | End: 2024-03-06

## 2024-03-05 RX ADMIN — FAMOTIDINE 20 MILLIGRAM(S): 10 INJECTION INTRAVENOUS at 08:52

## 2024-03-05 RX ADMIN — SODIUM CHLORIDE 125 MILLILITER(S): 9 INJECTION, SOLUTION INTRAVENOUS at 08:52

## 2024-03-05 RX ADMIN — CHLORHEXIDINE GLUCONATE 1 APPLICATION(S): 213 SOLUTION TOPICAL at 08:00

## 2024-03-05 RX ADMIN — Medication 400 MILLIGRAM(S): at 18:36

## 2024-03-05 RX ADMIN — Medication 15 MILLILITER(S): at 08:52

## 2024-03-05 RX ADMIN — Medication 1000 MILLIGRAM(S): at 19:06

## 2024-03-05 RX ADMIN — Medication 75 MILLIGRAM(S): at 21:26

## 2024-03-05 RX ADMIN — Medication 400 MILLIGRAM(S): at 23:31

## 2024-03-05 NOTE — DISCHARGE NOTE OB - HOSPITAL COURSE
Patient admitted for PCS due to history of rectal fistula. Delivery and postpartum course uncomplicated. Discharged home on POD Patient admitted for PCS due to history of rectal fistula. Delivery and postpartum course uncomplicated. Discharged home on POD #3

## 2024-03-05 NOTE — PRE-ANESTHESIA EVALUATION ADULT - NSANTHPMHFT_GEN_ALL_CORE
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 Pt denies hx/o abdominal surgery.  s/p spinal fusion for scoliosis 6 yrs ago;  scoliosis rx'd w/ brace at a teenager; increasing back pain in adulthood.  Hypothyroid  Pt denies other signif. PMH/PSH

## 2024-03-05 NOTE — DISCHARGE NOTE OB - MEDICATION SUMMARY - MEDICATIONS TO TAKE
I will START or STAY ON the medications listed below when I get home from the hospital:    acetaminophen 10 mg/mL intravenous solution  -- 100 milliliter(s) intravenous every 6 hours  -- Indication: For  delivery delivered    oxyCODONE 5 mg oral tablet  -- 1 tab(s) by mouth once As needed Moderate to Severe Pain (4-10)  -- Indication: For  delivery delivered    acetaminophen 325 mg oral tablet  -- 3 tab(s) by mouth every 6 hours  -- Indication: For  delivery delivered    oxyCODONE 5 mg oral tablet  -- 1 tab(s) by mouth every 3 hours As needed Moderate to Severe Pain (4-10)  -- Indication: For  delivery delivered    sertraline 100 mg oral tablet  -- 1 tab(s) by mouth once a day  -- Indication: For Anxiety    PNV-Total oral capsule  -- 1 cap(s) by mouth once a day  -- Indication: For  delivery delivered    ferrous sulfate 325 mg (65 mg elemental iron) oral tablet  -- 1 tab(s) by mouth 2 times a day  -- Indication: For Anemia    levothyroxine 112 mcg (0.112 mg) oral capsule  -- 1 cap(s) by mouth once a day  -- Indication: For Hypothyroid

## 2024-03-05 NOTE — OB PROVIDER DELIVERY SUMMARY - NSPROVIDERDELIVERYNOTE_OBGYN_ALL_OB_FT
Scheduled pLTCS for maternal hx of rectovaginal fistula  Viable vertex male infant, apgars 8/8, weight 2865g  Hysterotomy closed in 1 layer using caprosyn  Grossly normal uterus, tubes, and ovaries. No scar tissue noted in abdomen.  Abdomen closed in standard fashion  Pt and infant to recovery in stable condition  Dictation #  QBL: 453 mL        IVF: 1400 mL        UOP: 50 mL Scheduled pLTCS for maternal hx of rectovaginal fistula  1cm vertical incision made in center of hysterotomy to help deliver infant  Viable vertex male infant, apgars 8/8, weight 2865g  Hysterotomy closed in 1 layer using caprosyn  Grossly normal uterus, tubes, and ovaries. No scar tissue noted in abdomen.  Abdomen closed in standard fashion  Pt and infant to recovery in stable condition  Dictation #  QBL: 453 mL        IVF: 1400 mL        UOP: 50 mL Scheduled pLTCS for maternal hx of rectovaginal fistula  1cm vertical incision made in center of hysterotomy to help deliver infant  Viable vertex male infant, apgars 8/8, weight 2865g  Hysterotomy closed in 1 layer using caprosyn  Grossly normal uterus, tubes, and ovaries. No scar tissue noted in abdomen.  Abdomen closed in standard fashion  Pt and infant to recovery in stable condition  Dictation #[id number]  QBL: 453 mL        IVF: 1400 mL        UOP: 50 mL

## 2024-03-05 NOTE — DISCHARGE NOTE OB - CARE PLAN
1 Principal Discharge DX:	 delivery delivered  Assessment and plan of treatment:	Please call if you have fever, pain uncontrolled with pain medicine, signs of wound infection, heavy bleeding   Principal Discharge DX:	 delivery delivered  Assessment and plan of treatment:	Please call if you have fever, pain uncontrolled with pain medicine, signs of wound infection, heavy bleeding  CALL ENDOCRINE REGARDING PP DOSING OF LEVOTHYROXINE

## 2024-03-05 NOTE — OB PROVIDER DELIVERY SUMMARY - NSCSPRIMARYINDICATIONA_OBGYN_ALL_OB
1. Viral upper respiratory tract infection      2. Follow-up for resolved condition  Cleared from Mono - able to return to activities without restriction. Well child with a mild cold. Lungs and ears are clear.  Monitor for further evolution/resolutio 10 ml                        4                              2                       1  60-71 lbs               12.5 ml                     5                              2&1/2  72-95 lbs               15 ml                        6 Maternal Medical Complications

## 2024-03-05 NOTE — DISCHARGE NOTE OB - CAREGIVER NAME
Component      Latest Ref Rng & Units 6/26/2018   HGB      13.0 - 17.0 g/dL 12.3 (L)   HCT      39.0 - 51.0 % 37.8 (L)   Specimen Description       URINE, CLEAN CATCH/MIDSTREAM   CULTURE       <10,000 CFU/mL MIXED BACTERIAL BENNY WITH NO PREDOMINATING TYPE   REPORT STATUS       06/27/2018 FINAL   COLOR      YELLOW RED (A)   CLARITY       CLOUDY   GLUCOSE(URINE)      NEGATIVE mg/dL NEGATIVE   BILIRUBIN      NEGATIVE NEGATIVE   KETONES      NEGATIVE mg/dL TRACE (A)   SPECIFIC GRAVITY      1.005 - 1.030 1.015   BLOOD      NEGATIVE LARGE (A)   pH      5.0 - 7.0 Units 7.0   PROTEIN(URINE)      NEGATIVE mg/dL 100 (A)   UROBILINOGEN      0.0 - 1.0 mg/dL 2.0 (H)   NITRITE      NEGATIVE POSITIVE (A)   LEUKOCYTE ESTERASE      NEGATIVE LARGE (A)   URINE TYPE       URINE, CLEAN CATCH/MIDSTREAM   Squamous EPI'S      0 - 5 /hpf >100 (H)   RBC      0 - 3 /hpf NONE SEEN   WBC      0 - 5 /hpf 26 to 100   Bacteria      NONE SEEN /hpf FEW (A)   Hyaline Casts      0 - 5 /lpf NONE SEEN   MUCOUS       PRESENT         Please advise  
From: Mike Reaves  To: Guevara Watkins MD  Sent: 6/29/2018 10:07 AM CDT  Subject: urine test    Thank you for answering my question on blood test. my urine has cleared up alot,but I was a little worried about  the test result on urine??    Thank You   Mike Reaves  
Patient has had f/u appointment and seen germán since encounter has been opened. Will close encounter  
Allen Presley

## 2024-03-05 NOTE — PRE-ANESTHESIA EVALUATION ADULT - HEART RATE (BEATS/MIN)
76 Elidel Counseling: Patient may experience a mild burning sensation during topical application. Elidel is not approved in children less than 2 years of age. There have been case reports of hematologic and skin malignancies in patients using topical calcineurin inhibitors although causality is questionable.

## 2024-03-05 NOTE — OB RN PATIENT PROFILE - NSICDXPASTMEDICALHX_GEN_ALL_CORE_FT
PAST MEDICAL HISTORY:  Anxiety     Crohn disease     Fistula     History of influenza     Hypothyroidism

## 2024-03-05 NOTE — DISCHARGE NOTE OB - PATIENT PORTAL LINK FT
You can access the FollowMyHealth Patient Portal offered by [facility] by registering at the following website: [email or web address]. By joining [facility]’s FollowMyHealth portal, you will also be able to view your health information using other applications (apps) compatible with our system.

## 2024-03-05 NOTE — DISCHARGE NOTE OB - CARE PROVIDER_API CALL
Tatiana Montoya  Obstetrics and Gynecology  [address], [address]  [place] [address]  Phone: [phone]  Fax: [phone]  Follow Up Time:

## 2024-03-05 NOTE — DISCHARGE NOTE OB - PLAN OF CARE
Please call if you have fever, pain uncontrolled with pain medicine, signs of wound infection, heavy bleeding Please call if you have fever, pain uncontrolled with pain medicine, signs of wound infection, heavy bleeding  CALL ENDOCRINE REGARDING PP DOSING OF LEVOTHYROXINE

## 2024-03-05 NOTE — PRE-ANESTHESIA EVALUATION ADULT - NSANTHADDINFOFT_GEN_ALL_CORE
CSE, GETA, intrathecal duramorph explained to pt in detail.  Photocopy of post surgical XRAY suggests rods ending lower thoracic level;   Pt understands the potential for extended surgical time secondary to Crohn's,  failure of intrathecal as well as epidural spread most likely related to post surgical changes, necessitating GETA.  All questions answered.

## 2024-03-05 NOTE — OB RN DELIVERY SUMMARY - NS_SEPSISRSKCALC_OBGYN_ALL_OB_FT
EOS calculated successfully. EOS Risk Factor: 0.5/1000 live births ([facility] national incidence); GA=37w2d; Temp=97.9; ROM=0.033; GBS='Negative'; Antibiotics='No antibiotics or any antibiotics < 2 hrs prior to birth'

## 2024-03-05 NOTE — OB RN DELIVERY SUMMARY - NSSELHIDDEN_OBGYN_ALL_OB_FT
[NS_DeliveryAttending1_OBGYN_ALL_OB_FT:[id number]=],[NS_DeliveryAssist1_OBGYN_ALL_OB_FT:[id number]=],[NS_DeliveryRN_OBGYN_ALL_OB_FT:[id number]]

## 2024-03-05 NOTE — OB PROVIDER DELIVERY SUMMARY - NSSELHIDDEN_OBGYN_ALL_OB_FT
[NS_DeliveryAttending1_OBGYN_ALL_OB_FT:[email or web address]=],[NS_DeliveryAssist1_OBGYN_ALL_OB_FT:[id number]=],[NS_DeliveryRN_OBGYN_ALL_OB_FT:[id number]]

## 2024-03-05 NOTE — OB PROVIDER H&P - [id number]_ALL_OB
Elevated Blood Pressure: Care Instructions  Your Care Instructions    Blood pressure is a measure of how hard the blood pushes against the walls of your arteries. It's normal for blood pressure to go up and down throughout the day. But if it stays up over time, you have high blood pressure. Two numbers tell you your blood pressure. The first number is the systolic pressure. It shows how hard the blood pushes when your heart is pumping. The second number is the diastolic pressure. It shows how hard the blood pushes between heartbeats, when your heart is relaxed and filling with blood. An ideal blood pressure in adults is less than 120/80 (say \"120 over 80\"). High blood pressure is 140/90 or higher. You have high blood pressure if your top number is 140 or higher or your bottom number is 90 or higher, or both. The main test for high blood pressure is simple, fast, and painless. To diagnose high blood pressure, your doctor will test your blood pressure at different times. After testing your blood pressure, your doctor may ask you to test it again when you are home. If you are diagnosed with high blood pressure, you can work with your doctor to make a long-term plan to manage it. Follow-up care is a key part of your treatment and safety. Be sure to make and go to all appointments, and call your doctor if you are having problems. It's also a good idea to know your test results and keep a list of the medicines you take. How can you care for yourself at home? · Do not smoke. Smoking increases your risk for heart attack and stroke. If you need help quitting, talk to your doctor about stop-smoking programs and medicines. These can increase your chances of quitting for good. · Stay at a healthy weight. · Try to limit how much sodium you eat to less than 2,300 milligrams (mg) a day. Your doctor may ask you to try to eat less than 1,500 mg a day. · Be physically active.  Get at least 30 minutes of exercise on most days of the week. Walking is a good choice. You also may want to do other activities, such as running, swimming, cycling, or playing tennis or team sports. · Avoid or limit alcohol. Talk to your doctor about whether you can drink any alcohol. · Eat plenty of fruits, vegetables, and low-fat dairy products. Eat less saturated and total fats. · Learn how to check your blood pressure at home. When should you call for help? Call your doctor now or seek immediate medical care if:  ? · Your blood pressure is much higher than normal (such as 180/110 or higher). ? · You think high blood pressure is causing symptoms such as:  ¨ Severe headache. ¨ Blurry vision. ? Watch closely for changes in your health, and be sure to contact your doctor if:  ? · You do not get better as expected. Where can you learn more? Go to http://hyunMaginperla.info/. Enter M580 in the search box to learn more about \"Elevated Blood Pressure: Care Instructions. \"  Current as of: September 21, 2016  Content Version: 11.4  © 3261-8099 [employer]. Care instructions adapted under license by [employer] (which disclaims liability or warranty for this information). If you have questions about a medical condition or this instruction, always ask your healthcare professional. [address] any warranty or liability for your use of this information. Back Pain, Emergency or Urgent Symptoms: Care Instructions  Your Care Instructions    Many people have back pain at one time or another. In most cases, pain gets better with self-care that includes over-the-counter pain medicine, ice, heat, and exercises. Unless you have symptoms of a severe injury or heart attack, you may be able to give yourself a few days before you call a doctor. But some back problems are very serious. Do not ignore symptoms that need to be checked right away.   Follow-up care is a key part of your treatment and safety. Be sure to make and go to all appointments, and call your doctor if you are having problems. It's also a good idea to know your test results and keep a list of the medicines you take. How can you care for yourself at home? · Sit or lie in positions that are most comfortable and that reduce your pain. Try one of these positions when you lie down:  ¨ Lie on your back with your knees bent and supported by large pillows. ¨ Lie on the floor with your legs on the seat of a sofa or chair. Buster Cousins on your side with your knees and hips bent and a pillow between your legs. ¨ Lie on your stomach if it does not make pain worse. · Do not sit up in bed, and avoid soft couches and twisted positions. Bed rest can help relieve pain at first, but it delays healing. Avoid bed rest after the first day. · Change positions every 30 minutes. If you must sit for long periods of time, take breaks from sitting. Get up and walk around, or lie flat. · Try using a heating pad on a low or medium setting, for 15 to 20 minutes every 2 or 3 hours. Try a warm shower in place of one session with the heating pad. You can also buy single-use heat wraps that last up to 8 hours. You can also try ice or cold packs on your back for 10 to 20 minutes at a time, several times a day. (Put a thin cloth between the ice pack and your skin.) This reduces pain and makes it easier to be active and exercise. · Take pain medicines exactly as directed. ¨ If the doctor gave you a prescription medicine for pain, take it as prescribed. ¨ If you are not taking a prescription pain medicine, ask your doctor if you can take an over-the-counter medicine. When should you call for help? Call 911 anytime you think you may need emergency care. For example, call if:  ? · You are unable to move a leg at all. ? · You have back pain with severe belly pain. ? · You have symptoms of a heart attack.  These may include:  ¨ Chest pain or pressure, or a strange feeling in the chest.  ¨ Sweating. ¨ Shortness of breath. ¨ Nausea or vomiting. ¨ Pain, pressure, or a strange feeling in the back, neck, jaw, or upper belly or in one or both shoulders or arms. ¨ Lightheadedness or sudden weakness. ¨ A fast or irregular heartbeat. After you call 911, the  may tell you to chew 1 adult-strength or 2 to 4 low-dose aspirin. Wait for an ambulance. Do not try to drive yourself. ?Call your doctor now or seek immediate medical care if:  ? · You have new or worse symptoms in your arms, legs, chest, belly, or buttocks. Symptoms may include:  ¨ Numbness or tingling. ¨ Weakness. ¨ Pain. ? · You lose bladder or bowel control. ? · You have back pain and:  ¨ You have injured your back while lifting or doing some other activity. Call if the pain is severe, has not gone away after 1 or 2 days, and you cannot do your normal daily activities. ¨ You have had a back injury before that needed treatment. ¨ Your pain has lasted longer than 4 weeks. ¨ You have had weight loss you cannot explain. ¨ You are age 48 or older. ¨ You have cancer now or have had it before. ? Watch closely for changes in your health, and be sure to contact your doctor if you are not getting better as expected. Where can you learn more? Go to http://hyun-perla.info/. Enter I608 in the search box to learn more about \"Back Pain, Emergency or Urgent Symptoms: Care Instructions. \"  Current as of: March 20, 2017  Content Version: 11.4  © 9877-8679 [employer]. Care instructions adapted under license by [employer] (which disclaims liability or warranty for this information). If you have questions about a medical condition or this instruction, always ask your healthcare professional. [address] any warranty or liability for your use of this information.          Shoulder Pain: Care Instructions  Your Care Instructions    You can hurt your shoulder by using it too much during an activity, such as fishing or baseball. It can also happen as part of the everyday wear and tear of getting older. Shoulder injuries can be slow to heal, but your shoulder should get better with time. Your doctor may recommend a sling to rest your shoulder. If you have injured your shoulder, you may need testing and treatment. Follow-up care is a key part of your treatment and safety. Be sure to make and go to all appointments, and call your doctor if you are having problems. It's also a good idea to know your test results and keep a list of the medicines you take. How can you care for yourself at home? · Take pain medicines exactly as directed. ¨ If the doctor gave you a prescription medicine for pain, take it as prescribed. ¨ If you are not taking a prescription pain medicine, ask your doctor if you can take an over-the-counter medicine. ¨ Do not take two or more pain medicines at the same time unless the doctor told you to. Many pain medicines contain acetaminophen, which is Tylenol. Too much acetaminophen (Tylenol) can be harmful. · If your doctor recommends that you wear a sling, use it as directed. Do not take it off before your doctor tells you to. · Put ice or a cold pack on the sore area for 10 to 20 minutes at a time. Put a thin cloth between the ice and your skin. · If there is no swelling, you can put moist heat, a heating pad, or a warm cloth on your shoulder. Some doctors suggest alternating between hot and cold. · Rest your shoulder for a few days. If your doctor recommends it, you can then begin gentle exercise of the shoulder, but do not lift anything heavy. When should you call for help? Call 911 anytime you think you may need emergency care. For example, call if:  ? · You have chest pain or pressure. This may occur with:  ¨ Sweating. ¨ Shortness of breath. ¨ Nausea or vomiting.   ¨ Pain that spreads from the chest to the neck, jaw, or one or both shoulders or arms. ¨ Dizziness or lightheadedness. ¨ A fast or uneven pulse. After calling 911, chew 1 adult-strength aspirin. Wait for an ambulance. Do not try to drive yourself. ? · Your arm or hand is cool or pale or changes color. ?Call your doctor now or seek immediate medical care if:  ? · You have signs of infection, such as:  ¨ Increased pain, swelling, warmth, or redness in your shoulder. ¨ Red streaks leading from a place on your shoulder. ¨ Pus draining from an area of your shoulder. ¨ Swollen lymph nodes in your neck, armpits, or groin. ¨ A fever. ? Watch closely for changes in your health, and be sure to contact your doctor if:  ? · You cannot use your shoulder. ? · Your shoulder does not get better as expected. Where can you learn more? Go to http://hyunPlays.IOperla.info/. Enter F400 in the search box to learn more about \"Shoulder Pain: Care Instructions. \"  Current as of: March 21, 2017  Content Version: 11.4  © 1525-3865 [employer]. Care instructions adapted under license by [employer] (which disclaims liability or warranty for this information). If you have questions about a medical condition or this instruction, always ask your healthcare professional. [address] any warranty or liability for your use of this information. Shoulder Stretches: Exercises  Your Care Instructions  Here are some examples of exercises for your shoulder. Start each exercise slowly. Ease off the exercise if you start to have pain. Your doctor or physical therapist will tell you when you can start these exercises and which ones will work best for you. How to do the exercises  Shoulder stretch    1.  a doorway and place one arm against the door frame. Your elbow should be a little higher than your shoulder. 2. Relax your shoulders as you lean forward, allowing your chest and shoulder muscles to stretch.  You can also turn your body slightly away from your arm to stretch the muscles even more. 3. Hold for 15 to 30 seconds. 4. Repeat 2 to 4 times with each arm. Shoulder and chest stretch    1. Shoulder and chest stretch  2. While sitting, relax your upper body so you slump slightly in your chair. 3. As you breathe in, straighten your back and open your arms out to the sides. 4. Gently pull your shoulder blades back and downward. 5. Hold for 15 to 30 seconds as your breathe normally. 6. Repeat 2 to 4 times. Overhead stretch    1. Reach up over your head with both arms. 2. Hold for 15 to 30 seconds. 3. Repeat 2 to 4 times. Follow-up care is a key part of your treatment and safety. Be sure to make and go to all appointments, and call your doctor if you are having problems. It's also a good idea to know your test results and keep a list of the medicines you take. Where can you learn more? Go to http://hyun-perla.info/. Enter S254 in the search box to learn more about \"Shoulder Stretches: Exercises. \"  Current as of: March 21, 2017  Content Version: 11.4  © 0302-0093 [employer]. Care instructions adapted under license by [employer] (which disclaims liability or warranty for this information). If you have questions about a medical condition or this instruction, always ask your healthcare professional. [place] [address] any warranty or liability for your use of this information. Low Back Pain: Exercises  Your Care Instructions  Here are some examples of typical rehabilitation exercises for your condition. Start each exercise slowly. Ease off the exercise if you start to have pain. Your doctor or physical therapist will tell you when you can start these exercises and which ones will work best for you. How to do the exercises  Press-up    5. Lie on your stomach, supporting your body with your forearms.   6. Press your elbows down into the floor to raise your upper back. As you do this, relax your stomach muscles and allow your back to arch without using your back muscles. As your press up, do not let your hips or pelvis come off the floor. 7. Hold for 15 to 30 seconds, then relax. 8. Repeat 2 to 4 times. Alternate arm and leg (bird dog) exercise    Do this exercise slowly. Try to keep your body straight at all times, and do not let one hip drop lower than the other. 7. Start on the floor, on your hands and knees. 8. Tighten your belly muscles. 9. Raise one leg off the floor, and hold it straight out behind you. Be careful not to let your hip drop down, because that will twist your trunk. 10. Hold for about 6 seconds, then lower your leg and switch to the other leg. 11. Repeat 8 to 12 times on each leg. 12. Over time, work up to holding for 10 to 30 seconds each time. 13. If you feel stable and secure with your leg raised, try raising the opposite arm straight out in front of you at the same time. Knee-to-chest exercise    4. Lie on your back with your knees bent and your feet flat on the floor. 5. Bring one knee to your chest, keeping the other foot flat on the floor (or keeping the other leg straight, whichever feels better on your lower back). 6. Keep your lower back pressed to the floor. Hold for at least 15 to 30 seconds. 7. Relax, and lower the knee to the starting position. 8. Repeat with the other leg. Repeat 2 to 4 times with each leg. 9. To get more stretch, put your other leg flat on the floor while pulling your knee to your chest.  Curl-ups    1. Lie on the floor on your back with your knees bent at a 90-degree angle. Your feet should be flat on the floor, about 12 inches from your buttocks. 2. Cross your arms over your chest. If this bothers your neck, try putting your hands behind your neck (not your head), with your elbows spread apart. 3. Slowly tighten your belly muscles and raise your shoulder blades off the floor.   4. Keep your head in line with your body, and do not press your chin to your chest.  5. Hold this position for 1 or 2 seconds, then slowly lower yourself back down to the floor. 6. Repeat 8 to 12 times. Pelvic tilt exercise    1. Lie on your back with your knees bent. 2. \"Brace\" your stomach. This means to tighten your muscles by pulling in and imagining your belly button moving toward your spine. You should feel like your back is pressing to the floor and your hips and pelvis are rocking back. 3. Hold for about 6 seconds while you breathe smoothly. 4. Repeat 8 to 12 times. Heel dig bridging    1. Lie on your back with both knees bent and your ankles bent so that only your heels are digging into the floor. Your knees should be bent about 90 degrees. 2. Then push your heels into the floor, squeeze your buttocks, and lift your hips off the floor until your shoulders, hips, and knees are all in a straight line. 3. Hold for about 6 seconds as you continue to breathe normally, and then slowly lower your hips back down to the floor and rest for up to 10 seconds. 4. Do 8 to 12 repetitions. Hamstring stretch in doorway    1. Lie on your back in a doorway, with one leg through the open door. 2. Slide your leg up the wall to straighten your knee. You should feel a gentle stretch down the back of your leg. 3. Hold the stretch for at least 15 to 30 seconds. Do not arch your back, point your toes, or bend either knee. Keep one heel touching the floor and the other heel touching the wall. 4. Repeat with your other leg. 5. Do 2 to 4 times for each leg. Hip flexor stretch    1. Kneel on the floor with one knee bent and one leg behind you. Place your forward knee over your foot. Keep your other knee touching the floor. 2. Slowly push your hips forward until you feel a stretch in the upper thigh of your rear leg. 3. Hold the stretch for at least 15 to 30 seconds. Repeat with your other leg. 4. Do 2 to 4 times on each side.   Guille Armenta sit    1. Stand with your back 10 to 12 inches away from a wall. 2. Lean into the wall until your back is flat against it. 3. Slowly slide down until your knees are slightly bent, pressing your lower back into the wall. 4. Hold for about 6 seconds, then slide back up the wall. 5. Repeat 8 to 12 times. Follow-up care is a key part of your treatment and safety. Be sure to make and go to all appointments, and call your doctor if you are having problems. It's also a good idea to know your test results and keep a list of the medicines you take. Where can you learn more? Go to http://hyun-perla.info/. Enter L314 in the search box to learn more about \"Low Back Pain: Exercises. \"  Current as of: March 21, 2017  Content Version: 11.4  © 7153-5768 [employer]. Care instructions adapted under license by [employer] (which disclaims liability or warranty for this information). If you have questions about a medical condition or this instruction, always ask your healthcare professional. [address] any warranty or liability for your use of this information. negative

## 2024-03-05 NOTE — OB PROVIDER H&P - ASSESSMENT
A&P:  48y  at 37w5d admitted for scheduled pLTCS for h/o Crohn's disease with fistula. Of note patient is Flu+.  -routine labs  -EFM/toco  -NPO, IV hydration  Fetal: cat 1 tracing, fetal status reassuring  GBS: neg  Analgesia: spinal      d/w Dr. Jan Noonan PGY2   A&P:  48y  at 37w5d admitted for scheduled pLTCS for h/o Crohn's disease with fistula. Of note patient is Flu+ on tamiflu.  -routine labs  -EFM/toco  -NPO, IV hydration  Fetal: cat 1 tracing, fetal status reassuring  GBS: neg  Analgesia: spinal      d/w Dr. Jan Noonan PGY2

## 2024-03-05 NOTE — OB PROVIDER H&P - ATTENDING COMMENTS
Patient seen. Overall feeling well since flu diagnosis. did not take the last two doses of tamiflu. no fever. PCS due to history of rectal fistula. History of Crohns disease controlled on Stellara. Never had surgery. No abdominal surgery. Last took Stellara end of february- will let nicu team know. Consents signed. Risks reviewed including but not limited to bleeding, infection, damage to surrounding structures. Dr Simpson group available from general surgery in case of scar tissue/adhesions. Declined circumcision. All questions answered.     Tatiana Montoya DO

## 2024-03-05 NOTE — DISCHARGE NOTE OB - TEMPERATURE GREATER THAN 100.0  F ORALLY
Patient will be going out of town and will not be able to keep Thursday's appointment. He asks if you can write order for his supplies and he will reschedule upon his return. Thanks   Statement Selected

## 2024-03-05 NOTE — OB PROVIDER H&P - HISTORY OF PRESENT ILLNESS
R2 H&P    BOBY DEL CID is a 48y  at 37w5d admitted for scheduled pLTCS for h/o Crohn's disease with fistula. Of note patient flu+, ruled out for pneumonia at recent ED visit. Pt denies CTX, VB, LOF. Endorses FM.  Prenatal course: IVF pregnancy, anemia on PO iron supplementation. Pt had one episode of vaginal bleeding that resolved.  otherwise uncomplicated  GBS: neg  EFW:     OBHx:   GynHx: h/o chlamydia s/p treatment 15y ago. denies history of abnormal pap smears, denies history of fibroids/cysts  PMHx: h/o Crohn's disease with h/ oanovaginal fistula 12y ago on Stelara. H/o anxiety on Sertraline and Wellbutrin. H/o hypothyroidism. Denies history of asthma, bleeding/clotting disorder, hypertension, diabetes.  PSHx: s/p spinal fusion of unknown level for scoliosis. denies prior abdominal or uterine surgery.  Med: PNV, Stelara, Synthroid, Wellbutrin, Sertraline, PO iron  All: NKDA  SH: Denies use of tobacco/alcohol/drugs prior to or during pregnancy.    VS:Vital Signs Last 24 Hrs  T(C): 36.6 (05 Mar 2024 07:35), Max: 36.6 (05 Mar 2024 07:25)  T(F): 97.9 (05 Mar 2024 07:35), Max: 97.9 (05 Mar 2024 07:35)  HR: 70 (05 Mar 2024 08:04) (65 - 73)  BP: 121/67 (05 Mar 2024 07:35) (121/67 - 121/67)  BP(mean): --  RR: 16 (05 Mar 2024 07:35) (16 - 16)  SpO2: 100% (05 Mar 2024 08:04) (98% - 100%)    Parameters below as of 05 Mar 2024 07:35  Patient On (Oxygen Delivery Method): room air      GA: NAD  Cards: RRR  Pulm: CTAB  EFH: baseline 140, moderate variability, +accels, -decels  [place]: uterine irritability  VE: deferred  TAUS:  R2 H&P    BOBY DEL CID is a 48y  at 37w5d admitted for scheduled pLTCS for h/o Crohn's disease with fistula. Of note patient flu+, ruled out for pneumonia at recent ED visit. Pt denies CTX, VB, LOF. Endorses FM.  Prenatal course: IVF pregnancy, anemia on PO iron supplementation. Pt had one episode of vaginal bleeding that resolved.  otherwise uncomplicated  GBS: neg  EFW: pt reports baby meausring 6 pounds 2 weeks ago, approximately 3200g    OBHx:   GynHx: h/o chlamydia s/p treatment 15y ago. denies history of abnormal pap smears, denies history of fibroids/cysts  PMHx: h/o Crohn's disease with h/ oanovaginal fistula 12y ago on Stelara. H/o anxiety on Sertraline and Wellbutrin. H/o hypothyroidism. Denies history of asthma, bleeding/clotting disorder, hypertension, diabetes.  PSHx: s/p spinal fusion of unknown level for scoliosis. denies prior abdominal or uterine surgery.  Med: PNV, Stelara, Synthroid, Wellbutrin, Sertraline, PO iron  All: NKDA  SH: Denies use of tobacco/alcohol/drugs prior to or during pregnancy.    VS:Vital Signs Last 24 Hrs  T(C): 36.6 (05 Mar 2024 07:35), Max: 36.6 (05 Mar 2024 07:25)  T(F): 97.9 (05 Mar 2024 07:35), Max: 97.9 (05 Mar 2024 07:35)  HR: 70 (05 Mar 2024 08:04) (65 - 73)  BP: 121/67 (05 Mar 2024 07:35) (121/67 - 121/67)  BP(mean): --  RR: 16 (05 Mar 2024 07:35) (16 - 16)  SpO2: 100% (05 Mar 2024 08:04) (98% - 100%)    Parameters below as of 05 Mar 2024 07:35  Patient On (Oxygen Delivery Method): room air      GA: NAD  Cards: RRR  Pulm: CTAB  EFH: baseline 140, moderate variability, +accels, -decels  [place]: uterine irritability  VE: deferred  TAUS: vertex

## 2024-03-06 LAB
BASOPHILS # BLD AUTO: 0.03 K/UL — SIGNIFICANT CHANGE UP (ref 0–0.2)
BASOPHILS NFR BLD AUTO: 0.3 % — SIGNIFICANT CHANGE UP (ref 0–2)
EOSINOPHIL # BLD AUTO: 0.07 K/UL — SIGNIFICANT CHANGE UP (ref 0–0.5)
EOSINOPHIL NFR BLD AUTO: 0.6 % — SIGNIFICANT CHANGE UP (ref 0–6)
HCT VFR BLD CALC: 28 % — LOW (ref 34.5–45)
HGB BLD-MCNC: 9.3 G/DL — LOW (ref 11.5–15.5)
IMM GRANULOCYTES NFR BLD AUTO: 1.1 % — HIGH (ref 0–0.9)
LYMPHOCYTES # BLD AUTO: 19.3 % — SIGNIFICANT CHANGE UP (ref 13–44)
LYMPHOCYTES # BLD AUTO: 2.16 K/UL — SIGNIFICANT CHANGE UP (ref 1–3.3)
MCHC RBC-ENTMCNC: 31.8 PG — SIGNIFICANT CHANGE UP (ref 27–34)
MCHC RBC-ENTMCNC: 33.2 GM/DL — SIGNIFICANT CHANGE UP (ref 32–36)
MCV RBC AUTO: 95.9 FL — SIGNIFICANT CHANGE UP (ref 80–100)
MONOCYTES # BLD AUTO: 0.98 K/UL — HIGH (ref 0–0.9)
MONOCYTES NFR BLD AUTO: 8.8 % — SIGNIFICANT CHANGE UP (ref 2–14)
NEUTROPHILS # BLD AUTO: 7.83 K/UL — HIGH (ref 1.8–7.4)
NEUTROPHILS NFR BLD AUTO: 69.9 % — SIGNIFICANT CHANGE UP (ref 43–77)
NRBC # BLD: 0 /100 WBCS — SIGNIFICANT CHANGE UP (ref 0–0)
PLATELET # BLD AUTO: 172 K/UL — SIGNIFICANT CHANGE UP (ref 150–400)
RBC # BLD: 2.92 M/UL — LOW (ref 3.8–5.2)
RBC # FLD: 12.3 % — SIGNIFICANT CHANGE UP (ref 10.3–14.5)
WBC # BLD: 11.19 K/UL — HIGH (ref 3.8–10.5)
WBC # FLD AUTO: 11.19 K/UL — HIGH (ref 3.8–10.5)

## 2024-03-06 RX ORDER — FERROUS SULFATE 325(65) MG
325 TABLET ORAL
Refills: 0 | Status: DISCONTINUED | OUTPATIENT
Start: 2024-03-06 | End: 2024-03-08

## 2024-03-06 RX ORDER — OXYCODONE HYDROCHLORIDE 5 MG/1
5 TABLET ORAL
Refills: 0 | Status: DISCONTINUED | OUTPATIENT
Start: 2024-03-06 | End: 2024-03-08

## 2024-03-06 RX ORDER — ASCORBIC ACID 60 MG
500 TABLET,CHEWABLE ORAL DAILY
Refills: 0 | Status: DISCONTINUED | OUTPATIENT
Start: 2024-03-06 | End: 2024-03-08

## 2024-03-06 RX ORDER — ACETAMINOPHEN 500 MG
975 TABLET ORAL EVERY 6 HOURS
Refills: 0 | Status: DISCONTINUED | OUTPATIENT
Start: 2024-03-06 | End: 2024-03-08

## 2024-03-06 RX ORDER — BENZOCAINE AND MENTHOL 5; 1 G/100ML; G/100ML
1 LIQUID ORAL EVERY 4 HOURS
Refills: 0 | Status: DISCONTINUED | OUTPATIENT
Start: 2024-03-06 | End: 2024-03-08

## 2024-03-06 RX ORDER — SENNA PLUS 8.6 MG/1
1 TABLET ORAL DAILY
Refills: 0 | Status: DISCONTINUED | OUTPATIENT
Start: 2024-03-06 | End: 2024-03-08

## 2024-03-06 RX ADMIN — SENNA PLUS 1 TABLET(S): 8.6 TABLET ORAL at 11:45

## 2024-03-06 RX ADMIN — OXYCODONE HYDROCHLORIDE 5 MILLIGRAM(S): 5 TABLET ORAL at 21:10

## 2024-03-06 RX ADMIN — HEPARIN SODIUM 5000 UNIT(S): 5000 INJECTION INTRAVENOUS; SUBCUTANEOUS at 17:14

## 2024-03-06 RX ADMIN — OXYCODONE HYDROCHLORIDE 5 MILLIGRAM(S): 5 TABLET ORAL at 04:05

## 2024-03-06 RX ADMIN — Medication 325 MILLIGRAM(S): at 17:13

## 2024-03-06 RX ADMIN — BUPROPION HYDROCHLORIDE 300 MILLIGRAM(S): 150 TABLET, EXTENDED RELEASE ORAL at 11:21

## 2024-03-06 RX ADMIN — OXYCODONE HYDROCHLORIDE 5 MILLIGRAM(S): 5 TABLET ORAL at 23:27

## 2024-03-06 RX ADMIN — OXYCODONE HYDROCHLORIDE 5 MILLIGRAM(S): 5 TABLET ORAL at 11:36

## 2024-03-06 RX ADMIN — Medication 400 MILLIGRAM(S): at 05:58

## 2024-03-06 RX ADMIN — Medication 975 MILLIGRAM(S): at 11:22

## 2024-03-06 RX ADMIN — BENZOCAINE AND MENTHOL 1 LOZENGE: 5; 1 LIQUID ORAL at 17:24

## 2024-03-06 RX ADMIN — Medication 112 MICROGRAM(S): at 05:57

## 2024-03-06 RX ADMIN — HEPARIN SODIUM 5000 UNIT(S): 5000 INJECTION INTRAVENOUS; SUBCUTANEOUS at 05:57

## 2024-03-06 RX ADMIN — Medication 500 MILLIGRAM(S): at 11:21

## 2024-03-06 RX ADMIN — Medication 1000 MILLIGRAM(S): at 06:30

## 2024-03-06 RX ADMIN — Medication 975 MILLIGRAM(S): at 23:27

## 2024-03-06 RX ADMIN — Medication 1000 MILLIGRAM(S): at 00:00

## 2024-03-06 RX ADMIN — SERTRALINE 100 MILLIGRAM(S): 25 TABLET, FILM COATED ORAL at 11:21

## 2024-03-06 RX ADMIN — Medication 75 MILLIGRAM(S): at 17:12

## 2024-03-06 RX ADMIN — OXYCODONE HYDROCHLORIDE 5 MILLIGRAM(S): 5 TABLET ORAL at 11:52

## 2024-03-06 RX ADMIN — OXYCODONE HYDROCHLORIDE 5 MILLIGRAM(S): 5 TABLET ORAL at 20:10

## 2024-03-06 RX ADMIN — OXYCODONE HYDROCHLORIDE 5 MILLIGRAM(S): 5 TABLET ORAL at 04:45

## 2024-03-06 RX ADMIN — OXYCODONE HYDROCHLORIDE 5 MILLIGRAM(S): 5 TABLET ORAL at 17:12

## 2024-03-06 RX ADMIN — Medication 975 MILLIGRAM(S): at 11:52

## 2024-03-06 RX ADMIN — Medication 75 MILLIGRAM(S): at 08:36

## 2024-03-06 RX ADMIN — Medication 975 MILLIGRAM(S): at 17:13

## 2024-03-06 NOTE — PROVIDER CONTACT NOTE (CHANGE IN STATUS NOTIFICATION) - ACTION/TREATMENT ORDERED:
Dr Figueroa said to wait and see if the pain medication works or not. If it doesn't work Dr Figueroa will come to assess the pt.

## 2024-03-06 NOTE — CHART NOTE - NSCHARTNOTEFT_GEN_A_CORE
PA Anemia NOTE     POD#1        Vital Signs Last 24 Hrs  T(C): 36.6 (06 Mar 2024 05:22), Max: 37.3 (05 Mar 2024 15:15)  T(F): 97.8 (06 Mar 2024 05:22), Max: 99.1 (05 Mar 2024 15:15)  HR: 58 (06 Mar 2024 05:22) (57 - 94)  BP: 103/67 (06 Mar 2024 05:22) (87/50 - 121/71)  BP(mean): 73 (05 Mar 2024 15:30) (62 - 77)  RR: 18 (06 Mar 2024 05:22) (13 - 23)  SpO2: 96% (06 Mar 2024 05:22) (95% - 100%)    Parameters below as of 06 Mar 2024 05:22  Patient On (Oxygen Delivery Method): room air               9.3    11.19 )-----------( 172      ( 06 @ 06:53 )             28.0                11.5   7.96  )-----------( 206      ( 03-04 @ 16:29 )             34.8     Assessment:  48  y.o. S/P  C/S POD # 1 with anemia due to acute blood loss-VSS/Asx-not requiring blood tranfusion for Iron Supplementation  Plan:  - Ferrous Sulfate, Colace, Vitamin C supplementation.  - Monitor for signs/symptoms of anemia.     JULIETTE Gifford

## 2024-03-07 PROCEDURE — 93970 EXTREMITY STUDY: CPT | Mod: 26

## 2024-03-07 RX ADMIN — OXYCODONE HYDROCHLORIDE 5 MILLIGRAM(S): 5 TABLET ORAL at 03:30

## 2024-03-07 RX ADMIN — Medication 975 MILLIGRAM(S): at 06:34

## 2024-03-07 RX ADMIN — Medication 325 MILLIGRAM(S): at 05:35

## 2024-03-07 RX ADMIN — OXYCODONE HYDROCHLORIDE 5 MILLIGRAM(S): 5 TABLET ORAL at 05:36

## 2024-03-07 RX ADMIN — OXYCODONE HYDROCHLORIDE 5 MILLIGRAM(S): 5 TABLET ORAL at 13:45

## 2024-03-07 RX ADMIN — OXYCODONE HYDROCHLORIDE 5 MILLIGRAM(S): 5 TABLET ORAL at 12:48

## 2024-03-07 RX ADMIN — OXYCODONE HYDROCHLORIDE 5 MILLIGRAM(S): 5 TABLET ORAL at 00:15

## 2024-03-07 RX ADMIN — OXYCODONE HYDROCHLORIDE 5 MILLIGRAM(S): 5 TABLET ORAL at 02:32

## 2024-03-07 RX ADMIN — OXYCODONE HYDROCHLORIDE 5 MILLIGRAM(S): 5 TABLET ORAL at 09:40

## 2024-03-07 RX ADMIN — HEPARIN SODIUM 5000 UNIT(S): 5000 INJECTION INTRAVENOUS; SUBCUTANEOUS at 06:20

## 2024-03-07 RX ADMIN — Medication 975 MILLIGRAM(S): at 13:45

## 2024-03-07 RX ADMIN — Medication 112 MICROGRAM(S): at 05:36

## 2024-03-07 RX ADMIN — Medication 975 MILLIGRAM(S): at 05:36

## 2024-03-07 RX ADMIN — SERTRALINE 100 MILLIGRAM(S): 25 TABLET, FILM COATED ORAL at 12:48

## 2024-03-07 RX ADMIN — OXYCODONE HYDROCHLORIDE 5 MILLIGRAM(S): 5 TABLET ORAL at 16:05

## 2024-03-07 RX ADMIN — OXYCODONE HYDROCHLORIDE 5 MILLIGRAM(S): 5 TABLET ORAL at 06:34

## 2024-03-07 RX ADMIN — Medication 975 MILLIGRAM(S): at 18:57

## 2024-03-07 RX ADMIN — SENNA PLUS 1 TABLET(S): 8.6 TABLET ORAL at 12:48

## 2024-03-07 RX ADMIN — OXYCODONE HYDROCHLORIDE 5 MILLIGRAM(S): 5 TABLET ORAL at 17:05

## 2024-03-07 RX ADMIN — OXYCODONE HYDROCHLORIDE 5 MILLIGRAM(S): 5 TABLET ORAL at 20:22

## 2024-03-07 RX ADMIN — Medication 500 MILLIGRAM(S): at 12:49

## 2024-03-07 RX ADMIN — OXYCODONE HYDROCHLORIDE 5 MILLIGRAM(S): 5 TABLET ORAL at 08:43

## 2024-03-07 RX ADMIN — Medication 975 MILLIGRAM(S): at 00:15

## 2024-03-07 RX ADMIN — OXYCODONE HYDROCHLORIDE 5 MILLIGRAM(S): 5 TABLET ORAL at 21:22

## 2024-03-07 RX ADMIN — BUPROPION HYDROCHLORIDE 300 MILLIGRAM(S): 150 TABLET, EXTENDED RELEASE ORAL at 12:48

## 2024-03-07 RX ADMIN — Medication 75 MILLIGRAM(S): at 05:35

## 2024-03-07 RX ADMIN — HEPARIN SODIUM 5000 UNIT(S): 5000 INJECTION INTRAVENOUS; SUBCUTANEOUS at 18:32

## 2024-03-07 RX ADMIN — Medication 975 MILLIGRAM(S): at 18:33

## 2024-03-07 RX ADMIN — Medication 975 MILLIGRAM(S): at 12:49

## 2024-03-07 NOTE — PROGRESS NOTE ADULT - PROBLEM SELECTOR PLAN 1
Increase OOB  PO Pain Protocol  Continue Regular Diet  Continue Routine Postop/Postpartum Care
Increase OOB  Dressing removed  Regular diet  AM CBC  Continue routine prenatal care

## 2024-03-07 NOTE — PROGRESS NOTE ADULT - ATTENDING COMMENTS
POD2 s/p primary  section, doing well  -Routine postoperative care   -Reg diet   -OTITO Arciniega DO

## 2024-03-08 VITALS
RESPIRATION RATE: 18 BRPM | OXYGEN SATURATION: 97 % | DIASTOLIC BLOOD PRESSURE: 75 MMHG | HEART RATE: 68 BPM | SYSTOLIC BLOOD PRESSURE: 115 MMHG | TEMPERATURE: 98 F

## 2024-03-08 PROCEDURE — 86901 BLOOD TYPING SEROLOGIC RH(D): CPT

## 2024-03-08 PROCEDURE — 59050 FETAL MONITOR W/REPORT: CPT

## 2024-03-08 PROCEDURE — 85025 COMPLETE CBC W/AUTO DIFF WBC: CPT

## 2024-03-08 PROCEDURE — 93970 EXTREMITY STUDY: CPT

## 2024-03-08 PROCEDURE — 86780 TREPONEMA PALLIDUM: CPT

## 2024-03-08 PROCEDURE — 86900 BLOOD TYPING SEROLOGIC ABO: CPT

## 2024-03-08 PROCEDURE — 86850 RBC ANTIBODY SCREEN: CPT

## 2024-03-08 PROCEDURE — 59025 FETAL NON-STRESS TEST: CPT

## 2024-03-08 RX ORDER — OXYCODONE HYDROCHLORIDE 5 MG/1
1 TABLET ORAL
Qty: 0 | Refills: 0 | DISCHARGE
Start: 2024-03-08

## 2024-03-08 RX ORDER — ACETAMINOPHEN 500 MG
3 TABLET ORAL
Qty: 0 | Refills: 0 | DISCHARGE
Start: 2024-03-08

## 2024-03-08 RX ORDER — USTEKINUMAB 45 MG/.5ML
90 INJECTION, SOLUTION SUBCUTANEOUS
Refills: 0 | DISCHARGE

## 2024-03-08 RX ORDER — LEVOTHYROXINE SODIUM 125 MCG
1 TABLET ORAL
Refills: 0 | DISCHARGE

## 2024-03-08 RX ORDER — ACETAMINOPHEN 500 MG
100 TABLET ORAL
Qty: 0 | Refills: 0 | DISCHARGE
Start: 2024-03-08

## 2024-03-08 RX ORDER — BUPROPION HYDROCHLORIDE 150 MG/1
1 TABLET, EXTENDED RELEASE ORAL
Refills: 0 | DISCHARGE

## 2024-03-08 RX ORDER — FERROUS SULFATE 325(65) MG
1 TABLET ORAL
Qty: 0 | Refills: 0 | DISCHARGE
Start: 2024-03-08

## 2024-03-08 RX ORDER — SERTRALINE 25 MG/1
1 TABLET, FILM COATED ORAL
Refills: 0 | DISCHARGE

## 2024-03-08 RX ADMIN — HEPARIN SODIUM 5000 UNIT(S): 5000 INJECTION INTRAVENOUS; SUBCUTANEOUS at 06:56

## 2024-03-08 RX ADMIN — OXYCODONE HYDROCHLORIDE 5 MILLIGRAM(S): 5 TABLET ORAL at 06:57

## 2024-03-08 RX ADMIN — Medication 975 MILLIGRAM(S): at 06:57

## 2024-03-08 RX ADMIN — Medication 975 MILLIGRAM(S): at 00:03

## 2024-03-08 RX ADMIN — Medication 975 MILLIGRAM(S): at 01:04

## 2024-03-08 RX ADMIN — OXYCODONE HYDROCHLORIDE 5 MILLIGRAM(S): 5 TABLET ORAL at 03:40

## 2024-03-08 RX ADMIN — OXYCODONE HYDROCHLORIDE 5 MILLIGRAM(S): 5 TABLET ORAL at 10:57

## 2024-03-08 RX ADMIN — OXYCODONE HYDROCHLORIDE 5 MILLIGRAM(S): 5 TABLET ORAL at 04:40

## 2024-03-08 RX ADMIN — OXYCODONE HYDROCHLORIDE 5 MILLIGRAM(S): 5 TABLET ORAL at 00:02

## 2024-03-08 RX ADMIN — OXYCODONE HYDROCHLORIDE 5 MILLIGRAM(S): 5 TABLET ORAL at 11:30

## 2024-03-08 RX ADMIN — Medication 112 MICROGRAM(S): at 05:37

## 2024-03-08 RX ADMIN — Medication 325 MILLIGRAM(S): at 07:03

## 2024-03-08 RX ADMIN — OXYCODONE HYDROCHLORIDE 5 MILLIGRAM(S): 5 TABLET ORAL at 01:04

## 2024-03-08 NOTE — PROGRESS NOTE ADULT - NS ATTEND AMEND GEN_ALL_CORE FT
PT SITTING UP IN BED DOING WELL. WOULD LIKE TO GO HOME  VSS  INCISION C/D/I  FIRM FUNDUS  STABLE  D/C HOME  INSTR GIVEN  POSTOP 2 WEEKS    TOOTIE IBRAHIM

## 2024-03-08 NOTE — PROGRESS NOTE ADULT - SUBJECTIVE AND OBJECTIVE BOX
Postpartum Note,  Section   ATTENDING NOTE - Post-operative day 1    Subjective:  The patient feels well. Ambulating without difficulty  Pt is tolerating regular diet. Pain well controlled.  She denies nausea and vomiting.    (   )Baxter dc'd   awaiting spont void     (   ) Baxter still in place    (  ) baxter dc'd pt already voided  (  ) breastfeeding    She reports normal postpartum bleeding    Physical exam:    Vital Signs Last 24 Hrs  T(C): 36.6 (06 Mar 2024 05:22), Max: 37.3 (05 Mar 2024 15:15)  T(F): 97.8 (06 Mar 2024 05:22), Max: 99.1 (05 Mar 2024 15:15)  HR: 58 (06 Mar 2024 05:22) (57 - 94)  BP: 103/67 (06 Mar 2024 05:22) (87/50 - 121/71)  BP(mean): 73 (05 Mar 2024 15:30) (62 - 77)  RR: 18 (06 Mar 2024 05:22) (13 - 23)  SpO2: 96% (06 Mar 2024 05:22) (95% - 100%)    Parameters below as of 06 Mar 2024 05:22  Patient On (Oxygen Delivery Method): room air        Gen: NAD  Breast: Soft, nontender, not engorged.  Abdomen: Soft, nontender, no distension , firm uterine fundus at umbilicus -2.  Incision: Clean, dry, and intact with steris  Ext: No calf tenderness, no hyper reflexia, (  )trace (  )1+  edema      LABS:                        9.3    11.19 )-----------( 172      ( 06 Mar 2024 06:53 )             28.0                         11.5   7.96  )-----------( 206      ( 04 Mar 2024 16:29 )             34.8     ABO Interpretation: O (24 @ 07:57)  Rh Interpretation: Positive (24 @ 07:57)  ABO Interpretation: O (24 @ 17:34)  Rh Interpretation: Positive (24 @ 17:34)    Antibody ScreenNegative  Negative    24 @ 16:29      137  |  104  |  5<L>  ----------------------------<  77  3.1<L>   |  22  |  0.67        Ca    9.5      04 Mar 2024 16:29          Allergies    No Known Allergies    Intolerances      MEDICATIONS  (STANDING):  acetaminophen     Tablet .. 975 milliGRAM(s) Oral every 6 hours  acetaminophen   IVPB .. 1000 milliGRAM(s) IV Intermittent every 6 hours  buPROPion XL (24-Hour) . 300 milliGRAM(s) Oral daily  heparin   Injectable 5000 Unit(s) SubCutaneous every 12 hours  levothyroxine 112 MICROGram(s) Oral daily  morphine PF Spinal 0.1 milliGRAM(s) IntraThecal. once  oseltamivir 75 milliGRAM(s) Oral two times a day  oxytocin Infusion 333.333 milliUNIT(s)/Min (1000 mL/Hr) IV Continuous <Continuous>  sertraline 100 milliGRAM(s) Oral daily    MEDICATIONS  (PRN):  dexAMETHasone  Injectable 4 milliGRAM(s) IV Push every 6 hours PRN Nausea  magnesium hydroxide Suspension 30 milliLiter(s) Oral two times a day PRN Constipation  nalbuphine Injectable 2.5 milliGRAM(s) IV Push every 6 hours PRN Pruritus  naloxone Injectable 0.1 milliGRAM(s) IV Push every 3 minutes PRN For ANY of the following changes in patient status:  A. Breaths Per Minute LESS THAN 10, B. Oxygen saturation LESS THAN 90%, C. Sedation score of 6 for Stop After: 4 Times  ondansetron Injectable 4 milliGRAM(s) IV Push every 6 hours PRN Nausea  oxyCODONE    IR 5 milliGRAM(s) Oral every 3 hours PRN Mild Pain (1 - 3)  oxyCODONE    IR 5 milliGRAM(s) Oral every 3 hours PRN Moderate to Severe Pain (4-10)  oxyCODONE    IR 5 milliGRAM(s) Oral once PRN Moderate to Severe Pain (4-10)  oxyCODONE    IR 10 milliGRAM(s) Oral every 3 hours PRN Moderate Pain (4 - 6)        Assessment and Plan  POD # 1  s/p  section. Stable.  Encourage ambulation  Continue with PCEA/PCA  Regular diet as tolerated  [facility]    Office [phone]  Dr. Martinez                  
Day 1 of Anesthesia Pain Management Service    SUBJECTIVE: Doing ok  Pain Scale Score:          [X] Refer to charted pain scores    THERAPY:    s/p    100 mcg PF morphine on 3\5\2024      MEDICATIONS  (STANDING):  acetaminophen     Tablet .. 975 milliGRAM(s) Oral every 6 hours  acetaminophen   IVPB .. 1000 milliGRAM(s) IV Intermittent every 6 hours  buPROPion XL (24-Hour) . 300 milliGRAM(s) Oral daily  heparin   Injectable 5000 Unit(s) SubCutaneous every 12 hours  levothyroxine 112 MICROGram(s) Oral daily  morphine PF Spinal 0.1 milliGRAM(s) IntraThecal. once  oseltamivir 75 milliGRAM(s) Oral two times a day  oxytocin Infusion 333.333 milliUNIT(s)/Min (1000 mL/Hr) IV Continuous <Continuous>  sertraline 100 milliGRAM(s) Oral daily    MEDICATIONS  (PRN):  dexAMETHasone  Injectable 4 milliGRAM(s) IV Push every 6 hours PRN Nausea  magnesium hydroxide Suspension 30 milliLiter(s) Oral two times a day PRN Constipation  nalbuphine Injectable 2.5 milliGRAM(s) IV Push every 6 hours PRN Pruritus  naloxone Injectable 0.1 milliGRAM(s) IV Push every 3 minutes PRN For ANY of the following changes in patient status:  A. Breaths Per Minute LESS THAN 10, B. Oxygen saturation LESS THAN 90%, C. Sedation score of 6 for Stop After: 4 Times  ondansetron Injectable 4 milliGRAM(s) IV Push every 6 hours PRN Nausea  oxyCODONE    IR 5 milliGRAM(s) Oral every 3 hours PRN Mild Pain (1 - 3)  oxyCODONE    IR 5 milliGRAM(s) Oral every 3 hours PRN Moderate to Severe Pain (4-10)  oxyCODONE    IR 5 milliGRAM(s) Oral once PRN Moderate to Severe Pain (4-10)  oxyCODONE    IR 10 milliGRAM(s) Oral every 3 hours PRN Moderate Pain (4 - 6)      OBJECTIVE:    Sedation:        	[X] Alert	 [ ] Drowsy	[ ] Arousable      [ ] Asleep       [ ] Unresponsive    Side Effects:	[X] None 	[ ] Nausea	[ ] Vomiting         [ ] Pruritus  		[ ] Weakness            [ ] Numbness	          [ ] Other:    Vital Signs Last 24 Hrs  T(C): 36.6 (06 Mar 2024 05:22), Max: 37.3 (05 Mar 2024 15:15)  T(F): 97.8 (06 Mar 2024 05:22), Max: 99.1 (05 Mar 2024 15:15)  HR: 58 (06 Mar 2024 05:22) (57 - 94)  BP: 103/67 (06 Mar 2024 05:22) (87/50 - 121/71)  BP(mean): 73 (05 Mar 2024 15:30) (62 - 77)  RR: 18 (06 Mar 2024 05:22) (13 - 23)  SpO2: 96% (06 Mar 2024 05:22) (95% - 100%)    Parameters below as of 06 Mar 2024 05:22  Patient On (Oxygen Delivery Method): room air        ASSESSMENT/ PLAN  [X] Patient to be transitioned to prn analgesics after 24 hours  [X] Pain management per primary service, pain service to sign off   [X]Documentation and Verification of current medications
Postpartum Note-  Section POD#1    Allergies  No Known Allergies      Blood Type  O  --  Positive  Rubella immune  RPR negPatient w/o complaints, pain is controlled.  Pt is OOB, tolerating PO, not passing flatus. Lochia WNL. Voiding freely    O:  Vital Signs Last 24 Hrs  T(C): 36.6 (06 Mar 2024 05:22), Max: 37.3 (05 Mar 2024 15:15)  T(F): 97.8 (06 Mar 2024 05:22), Max: 99.1 (05 Mar 2024 15:15)  HR: 58 (06 Mar 2024 05:22) (57 - 94)  BP: 103/67 (06 Mar 2024 05:22) (87/50 - 121/71)  BP(mean): 73 (05 Mar 2024 15:30) (62 - 77)  RR: 18 (06 Mar 2024 05:22) (13 - 23)  SpO2: 96% (06 Mar 2024 05:22) (95% - 100%)    Parameters below as of 06 Mar 2024 05:22  Patient On (Oxygen Delivery Method): room air      I&O's Summary    05 Mar 2024 07:01  -  06 Mar 2024 07:00  --------------------------------------------------------  IN: 1400 mL / OUT: 1303 mL / NET: 97 mL        Gen: NAD  Heart: S1S2 RRR  Lungs: CTA b/l  Abdomen: Soft, nontender, non-distended, fundus firm. +BS  Incision: Clean, dry, and intact.  Negative erythema/edema/ecchymosis   Sub Q  Lochia WNL  Ext: PAS in place. Negative Homans B/L    LABS:             11.5   7.96  )-----------( 206      ( 04 @ 16:29 )             34.8       PAST MEDICAL & SURGICAL HISTORY:  Crohn disease      Hypothyroidism      Anxiety      History of influenza      Fistula      H/O spinal fusion      Current Issues: none              
Postpartum Note-  Section POD#2      Rubella IgG:    Immune                  RPR:               Negative        Blood Type:    O+    S:Patient is a  48y G  1  P  1  POD#2 S/P C/Sec  Subjective: Patient w/o complaints, pain is controlled.  Pt is OOB, tolerating PO, passing flatus, and voiding. Lochia WNL.  The patient is c/o right lower calf pain.   Feeding: Breastfeeding    O:  Vital Signs Last 24 Hrs  T(C): 36.5 (07 Mar 2024 06:02), Max: 36.9 (06 Mar 2024 08:55)  T(F): 97.7 (07 Mar 2024 06:02), Max: 98.4 (06 Mar 2024 08:55)  HR: 62 (07 Mar 2024 06:02) (62 - 76)  BP: 116/79 (07 Mar 2024 06:02) (93/60 - 116/79)  BP(mean): --  RR: 18 (07 Mar 2024 06:02) (18 - 18)  SpO2: 99% (07 Mar 2024 06:02) (97% - 99%)    Parameters below as of 07 Mar 2024 06:02  Patient On (Oxygen Delivery Method): room air          Gen: NAD  CV: rrr s1s2, CTABL  Abdomen: Soft, nontender, non distened, fundus firm.  Bowel Sounds x 4 quadrants  Incision: Clean, dry, and intact.  Negative erythema/edema/ecchymosis.   SubQ  Lochia WNL  Ext: +2 edema, + right calf tenderness.  Pedal pulses palpated B/L    LABS:                          9.3    11.19 )-----------( 172      ( 06 Mar 2024 06:53 )             28.0       A/P:  48y  POD # 2 S/P  primary  section c/w Crohn's Disease, doing well    PMHx: GynHx: h/o chlamydia s/p treatment 15y ago  PMHx: h/o Crohn's disease with h/ oanovaginal fistula 12y ago on Stelara. H/o anxiety on Sertraline and Wellbutrin. H/o hypothyroidism. Denies history of asthma, bleeding/clotting disorder, hypertension, diabetes.  PSHx: s/p spinal fusion of unknown level for scoliosis. denies prior abdominal or uterine surgery.    Current Issues: Right calf tenderness, r/o DVT.  Dopplers ordered    Increase OOB  PO Pain Protocol  Continue Regular Diet  Continue Routine Postop/Postpartum Care          
 Postpartum Note-  Section POD#1    Allergies    No Known Allergies    Intolerances          Rubella:  immune                     RPR:    negative                 Blood Type: O positive    S:Patient is a  48y P1 now POD#3 s/p pLTCS for hx of Crohn's dx and fistula   Patient w/o complaints, pain is controlled.  Pt is OOB, tolerating PO, passing flatus. Lochia WNL.   Feeding: breastfeeding     O:  Vital Signs Last 24 Hrs  T(C): 36.8 (08 Mar 2024 05:35), Max: 36.8 (07 Mar 2024 22:21)  T(F): 98.2 (08 Mar 2024 05:35), Max: 98.2 (07 Mar 2024 22:21)  HR: 68 (08 Mar 2024 05:35) (68 - 74)  BP: 115/75 (08 Mar 2024 05:35) (107/76 - 115/75)  BP(mean): --  RR: 18 (08 Mar 2024 05:35) (18 - 18)  SpO2: 97% (08 Mar 2024 05:35) (97% - 98%)    Parameters below as of 08 Mar 2024 05:35  Patient On (Oxygen Delivery Method): room air      I&O's Summary      Gen: NAD  CV: rrr s1s2, CTABL  Abdomen: Soft, nontender, non-distended, fundus firm.  Bowel sounds x 4 quadrants  Incision: Clean, dry, and intact.  Negative erythema/edema/ecchymosis   Sub Q  Lochia WNL  Ext: PAS in place. Negative Homans B/L.  Pedal pulses palpated B/L    LABS:

## 2024-03-08 NOTE — PROGRESS NOTE ADULT - ASSESSMENT
Day 1 of Anesthesia Pain Management Service    SUBJECTIVE:  Pain Scale Score:          [X] Refer to charted pain scores    THERAPY: Received PF neuraxial morphine as per pain service nurse's note    OBJECTIVE:    Sedation:        	[X] Alert	[ ] Drowsy	[ ] Arousable      [ ] Asleep       [ ] Unresponsive    Side Effects:	[X] None	[ ] Nausea	[ ] Vomiting         [ ] Pruritus  		[ ] Weakness            [ ] Numbness	          [ ] Other:    ASSESSMENT/ PLAN  [X] Patient transitioned to prn analgesics  [X] Pain management per primary service, pain service to sign off   [X]Documentation and Verification of current medications
A/P:  48y  p1 now POD # 1 S/P  primary   section, doing well    PMHx:  PAST MEDICAL & SURGICAL HISTORY:  Crohn disease      Hypothyroidism      Anxiety      History of influenza      Fistula      H/O spinal fusion        Current Issues:    Increase OOB  DVT ppx  Regular diet  Routine Postpartum/Post-op care  discharge planning     Johana Raymundo PA-C
A/P:  48y  POD # 1 S/P    section, doing well        
48y  POD # 2 S/P  primary  section c/w Crohn's Disease, doing well

## 2024-06-17 NOTE — DISCHARGE NOTE OB - NS OB DC TDAP REASON NOT RECEIVED
[Spouse] : spouse [FreeTextEntry1] : annual physical  [de-identified] : BRE KRUEGER is a 69 year old F who presents today for her annual physical. Pt has a hx of HTN and HLD. Pt last had a mammogram in 7/23. Pt last saw the GYN two years ago. Pt had a Cologuard test done that came back negative. Pt has no new complaints.  Contraindicated

## 2024-06-26 NOTE — OB PROVIDER H&P - PRO BLOOD TYPE INFANT
Take over the counter iron 65 mg (sold as ferrous sulfate 325 mg daily). It can cause constipation so make sure you have adequate fiber and water intake.    Vitamin D is low. Please take one capsule weekly.    
O positive

## 2025-07-26 ENCOUNTER — NON-APPOINTMENT (OUTPATIENT)
Age: 50
End: 2025-07-26